# Patient Record
Sex: MALE | Race: WHITE | NOT HISPANIC OR LATINO | Employment: FULL TIME | URBAN - NONMETROPOLITAN AREA
[De-identification: names, ages, dates, MRNs, and addresses within clinical notes are randomized per-mention and may not be internally consistent; named-entity substitution may affect disease eponyms.]

---

## 2020-11-19 ENCOUNTER — LAB (OUTPATIENT)
Dept: LAB | Facility: HOSPITAL | Age: 65
End: 2020-11-19

## 2020-11-19 ENCOUNTER — OFFICE VISIT (OUTPATIENT)
Dept: NEUROSURGERY | Facility: CLINIC | Age: 65
End: 2020-11-19

## 2020-11-19 VITALS
HEIGHT: 72 IN | BODY MASS INDEX: 42.66 KG/M2 | TEMPERATURE: 98.4 F | DIASTOLIC BLOOD PRESSURE: 83 MMHG | RESPIRATION RATE: 20 BRPM | SYSTOLIC BLOOD PRESSURE: 151 MMHG | WEIGHT: 315 LBS | HEART RATE: 66 BPM

## 2020-11-19 DIAGNOSIS — M51.36 DEGENERATIVE DISC DISEASE, LUMBAR: Primary | ICD-10-CM

## 2020-11-19 DIAGNOSIS — M48.062 SPINAL STENOSIS OF LUMBAR REGION WITH NEUROGENIC CLAUDICATION: ICD-10-CM

## 2020-11-19 PROBLEM — M51.369 DEGENERATIVE DISC DISEASE, LUMBAR: Status: ACTIVE | Noted: 2020-11-19

## 2020-11-19 LAB
ANION GAP SERPL CALCULATED.3IONS-SCNC: 7.6 MMOL/L (ref 5–15)
BUN SERPL-MCNC: 19 MG/DL (ref 8–23)
BUN/CREAT SERPL: 18.6 (ref 7–25)
CALCIUM SPEC-SCNC: 9.6 MG/DL (ref 8.6–10.5)
CHLORIDE SERPL-SCNC: 107 MMOL/L (ref 98–107)
CO2 SERPL-SCNC: 27.4 MMOL/L (ref 22–29)
CREAT SERPL-MCNC: 1.02 MG/DL (ref 0.76–1.27)
GFR SERPL CREATININE-BSD FRML MDRD: 73 ML/MIN/1.73
GLUCOSE SERPL-MCNC: 116 MG/DL (ref 65–99)
HBA1C MFR BLD: 5.8 % (ref 4.8–5.6)
POTASSIUM SERPL-SCNC: 4.4 MMOL/L (ref 3.5–5.2)
SODIUM SERPL-SCNC: 142 MMOL/L (ref 136–145)

## 2020-11-19 PROCEDURE — 36415 COLL VENOUS BLD VENIPUNCTURE: CPT | Performed by: NEUROLOGICAL SURGERY

## 2020-11-19 PROCEDURE — 80048 BASIC METABOLIC PNL TOTAL CA: CPT | Performed by: NEUROLOGICAL SURGERY

## 2020-11-19 PROCEDURE — 99203 OFFICE O/P NEW LOW 30 MIN: CPT | Performed by: NEUROLOGICAL SURGERY

## 2020-11-19 PROCEDURE — 83036 HEMOGLOBIN GLYCOSYLATED A1C: CPT | Performed by: NEUROLOGICAL SURGERY

## 2020-11-19 RX ORDER — NABUMETONE 750 MG/1
750 TABLET, FILM COATED ORAL 2 TIMES DAILY
Qty: 60 TABLET | Refills: 1 | Status: SHIPPED | OUTPATIENT
Start: 2020-11-19 | End: 2021-10-26

## 2020-11-19 RX ORDER — PREDNISONE 10 MG/1
TABLET ORAL
COMMUNITY
End: 2021-10-26

## 2020-11-19 RX ORDER — ALLOPURINOL 300 MG/1
TABLET ORAL
COMMUNITY
Start: 2020-10-09

## 2020-11-19 RX ORDER — LOSARTAN POTASSIUM 100 MG/1
TABLET ORAL
COMMUNITY
Start: 2020-10-09 | End: 2021-10-26

## 2020-11-19 RX ORDER — METHOCARBAMOL 750 MG/1
750 TABLET, FILM COATED ORAL NIGHTLY
Qty: 30 TABLET | Refills: 1 | Status: SHIPPED | OUTPATIENT
Start: 2020-11-19 | End: 2021-10-26

## 2020-11-19 RX ORDER — BUMETANIDE 1 MG/1
TABLET ORAL
COMMUNITY

## 2020-11-19 RX ORDER — MELOXICAM 15 MG/1
TABLET ORAL
COMMUNITY
Start: 2020-10-26 | End: 2020-11-19

## 2020-11-19 RX ORDER — HYDROCODONE BITARTRATE AND ACETAMINOPHEN 10; 325 MG/1; MG/1
TABLET ORAL
COMMUNITY
End: 2022-04-26

## 2020-11-19 RX ORDER — AMLODIPINE BESYLATE 5 MG/1
TABLET ORAL
COMMUNITY
Start: 2020-10-09 | End: 2021-10-26

## 2020-11-19 RX ORDER — LIRAGLUTIDE 6 MG/ML
INJECTION SUBCUTANEOUS
COMMUNITY
End: 2021-10-26

## 2020-11-19 NOTE — PROGRESS NOTES
Wade Mack  1955  [id number]      Chief Complaint   Patient presents with   • Numbness     BLE   • Leg Swelling     BLE       HISTORY OF PRESENT ILLNESS: This is a 65-year-old male who presents with chief complaint of pain in his lumbar area, numbness and swelling of both lower extremities which have gotten progressively worse.  He has seen a rheumatologist and has been placed on meloxicam.  He has been on this several months and has seen no benefit.  He has also been on prednisone which has not helped.  In addition to the symptoms he has diabetes and is morbidly obese.  Lumbar MRI has been performed and he is referred for neurosurgical consultation.    His symptoms are consistent with neurogenic claudication.  His pain is worse when he is walking and standing.  Not been to physical therapy.    Past Medical History:   Diagnosis Date   • Arthritis    • Diabetes mellitus (CMS/HCC)    • Gout    • Hypertension    • Low back pain        Past Surgical History:   Procedure Laterality Date   • SHOULDER SURGERY Left        Family History   Problem Relation Age of Onset   • Hypertension Father    • Arthritis Father        Social History     Socioeconomic History   • Marital status:      Spouse name: Not on file   • Number of children: Not on file   • Years of education: Not on file   • Highest education level: Not on file   Tobacco Use   • Smoking status: Former Smoker     Packs/day: 3.00     Quit date: 1995     Years since quittin.0   • Smokeless tobacco: Never Used   Substance and Sexual Activity   • Alcohol use: Not Currently     Frequency: Never     Comment: Former use   • Drug use: Never   • Sexual activity: Defer       No Known Allergies      Current Outpatient Medications:   •  allopurinol (ZYLOPRIM) 300 MG tablet, , Disp: , Rfl:   •  amLODIPine (NORVASC) 5 MG tablet, , Disp: , Rfl:   •  bumetanide (BUMEX) 1 MG tablet, bumetanide 1 mg tablet, Disp: , Rfl:   •  HYDROcodone-acetaminophen  (NORCO)  MG per tablet, hydrocodone 10 mg-acetaminophen 325 mg tablet, Disp: , Rfl:   •  Liraglutide (Victoza) 18 MG/3ML solution pen-injector injection, Victoza 3-Mesfin 0.6 mg/0.1 mL (18 mg/3 mL) subcutaneous pen injector, Disp: , Rfl:   •  losartan (COZAAR) 100 MG tablet, , Disp: , Rfl:   •  meloxicam (MOBIC) 15 MG tablet, , Disp: , Rfl:   •  predniSONE (DELTASONE) 10 MG tablet, prednisone 10 mg tablet, Disp: , Rfl:     Review of Systems   Constitutional: Positive for fatigue. Negative for activity change, appetite change, chills, diaphoresis, fever and unexpected weight change.   HENT: Positive for hearing loss. Negative for congestion, dental problem, drooling, ear discharge, ear pain, facial swelling, mouth sores, nosebleeds, postnasal drip, rhinorrhea, sinus pressure, sneezing, sore throat, tinnitus, trouble swallowing and voice change.    Eyes: Negative for photophobia, pain, discharge, redness, itching and visual disturbance.   Respiratory: Positive for shortness of breath and wheezing. Negative for apnea, cough, choking, chest tightness and stridor.    Cardiovascular: Positive for leg swelling. Negative for chest pain and palpitations.   Gastrointestinal: Negative for abdominal distention, abdominal pain, anal bleeding, blood in stool, constipation, diarrhea, nausea, rectal pain and vomiting.   Endocrine: Negative for cold intolerance, heat intolerance, polydipsia, polyphagia and polyuria.   Genitourinary: Negative for decreased urine volume, difficulty urinating, dysuria, enuresis, flank pain, frequency, genital sores, hematuria and urgency.   Musculoskeletal: Positive for back pain and joint swelling. Negative for arthralgias, gait problem, myalgias, neck pain and neck stiffness.   Skin: Negative for color change, pallor, rash and wound.   Allergic/Immunologic: Negative for environmental allergies, food allergies and immunocompromised state.   Neurological: Positive for speech difficulty, weakness and  numbness. Negative for dizziness, tremors, seizures, syncope, facial asymmetry, light-headedness and headaches.   Hematological: Negative for adenopathy. Bruises/bleeds easily.   Psychiatric/Behavioral: Positive for agitation. Negative for behavioral problems, confusion, decreased concentration, dysphoric mood, hallucinations, self-injury, sleep disturbance and suicidal ideas. The patient is not nervous/anxious and is not hyperactive.    All other systems reviewed and are negative.      There were no vitals filed for this visit.    Neurological Examination:    Mental status/speech: The patient is alert and oriented.  Speech is clear without aphysia or dysarthria.  No overt cognitive deficits.    Cranial nerve examination:    Olfaction: Smell is intact.  Vision: Vision is intact without visual field abnormalities.  Funduscopic examination is normal.  No pupillary irregularity.  Ocular motor examination: The extraocular muscles are intact.  There is no diplopia.  The pupil is round and reactive to both light and accommodation.  There is no nystagmus.  Facial movement/sensation: There is no facial weakness.  Sensation is intact in the first, second, and third divisions of the trigeminal nerve.  The corneal reflex is intact.  Auditory: Hearing is intact to finger rub bilaterally.  Cranial nerves IX, X, XI, XII: Phonation is normal.  No dysphagia.  Tongue is protruded in the midline without atrophy.  The gag reflex is intact.  Shoulder shrug is normal.    Musculoligamentous ligamentous examination: BMI 49.0; weight 361 pounds.  He is morbidly obese with limited range of motion lumbar spine.  Straight leg raising, Lasègue and flip test are negative.  He has marked edema in both lower extremities of 3+ pitting.  He is areflexic in the upper and lower extremities.  His strength however is excellent.  There is no Babinski, Shadia or clonus.          Medical Decision Making:     Diagnostic Data Set: The lumbar MRI suggest  the presence of spinal stenosis in the lumbar region.      Assessment: Neurogenic claudication secondary to spinal stenosis; morbid obesity,          Recommendations: The lumbar MRI provides an explanation for his symptoms namely spinal stenosis secondary to degenerative osteoarthritis.  However he is not an acceptable surgical candidate at the present time because of his obesity and marked edema in both lower extremities.  The edema is not related to the arthritis.  He is taking steroids which I suggest to be discontinued.  This is counterproductive particularly in view of his diabetes.  I have given him a prescription of Relafen 750 mg twice daily; Robaxin-750 milligrams at night and have asked him to see Alistair Morris for a short course of physical therapy.  I am not confident this will provide much relief, unfortunately.  I have obtained a BMP and A1c for completeness sake.  He will call me in [place] in 2 weeks.    I have explained his diagnosis, therapeutic options and risks.  Our goal at this time should be that of weight reduction and further evaluation of the marked edema in both of his lower extremities.  Obviously he is retaining significant amount of fluid which need to be addressed before any consideration of surgical intervention.  I am hopeful we can help him.  I will keep you informed of his progress and if there is any information I need to know please feel free to call me as well.    The results of the A1c and BMP indicate a serum glucose of 116.  A1c 5.8.  No evidence of renal dysfunction.  These are surprisingly good.  I am hopeful he will continue to lose weight and show improvement with physical therapy and medication.  I will continue to follow him in the course keep you informed.    I greatly appreciate the opportunity to see and evaluate this individual.  If you have questions or concerns regarding issues that I may have overlooked please call me at any time: [phone].  Azael Brooks,  M.D.  [facility]  [address].  formerly [facility]  [id number]    Scribed for Henrique Brooks MD by Hyun Upton CMA 11/19/2020 09:22 EST  I have read and concur with the information provided by the scribe.  Henrique Brooks MD

## 2021-10-26 ENCOUNTER — OFFICE VISIT (OUTPATIENT)
Dept: CARDIOLOGY | Facility: CLINIC | Age: 66
End: 2021-10-26

## 2021-10-26 VITALS
HEIGHT: 73 IN | WEIGHT: 292.6 LBS | BODY MASS INDEX: 38.78 KG/M2 | DIASTOLIC BLOOD PRESSURE: 89 MMHG | SYSTOLIC BLOOD PRESSURE: 154 MMHG | HEART RATE: 56 BPM | OXYGEN SATURATION: 97 %

## 2021-10-26 DIAGNOSIS — R06.02 SOB (SHORTNESS OF BREATH): ICD-10-CM

## 2021-10-26 DIAGNOSIS — I48.91 ATRIAL FIBRILLATION, UNSPECIFIED TYPE (HCC): ICD-10-CM

## 2021-10-26 DIAGNOSIS — R00.2 PALPITATIONS: Primary | ICD-10-CM

## 2021-10-26 DIAGNOSIS — Z01.810 PRE-OPERATIVE CARDIOVASCULAR EXAMINATION: ICD-10-CM

## 2021-10-26 PROCEDURE — 93000 ELECTROCARDIOGRAM COMPLETE: CPT | Performed by: PHYSICIAN ASSISTANT

## 2021-10-26 PROCEDURE — 99204 OFFICE O/P NEW MOD 45 MIN: CPT | Performed by: PHYSICIAN ASSISTANT

## 2021-10-26 RX ORDER — HYDROCHLOROTHIAZIDE 25 MG/1
25 TABLET ORAL DAILY
COMMUNITY
End: 2022-04-26

## 2021-10-26 NOTE — PROGRESS NOTES
Subjective   Wade Mack is a 66 y.o. male     Chief Complaint   Patient presents with   • [facility] [place] rep. Nov 2021   • Palpitations       HPI  Patient presents to clinic today for preoperative cardiac evaluation, also in the setting of new onset A. fib.  He was seen through his primary care provider last week for preoperative clearance prior to right knee replacement.  By exam, he was noted to be irregular.  An EKG and follow-up supported A. fib which was new onset.  It was recommended that he have a cardiac clearance at that time and he presents today in the setting.  Clinically, the patient is doing well.  He does feel palpitations at times but would not have realized that he was in A. fib.  He reports no chest pain.  He has fairly advanced dyspnea which he feels is mostly related to his weight.  He has no PND orthopnea.  He denies dizziness or syncope.  His surgery for right knee replacement is scheduled in mid November.  He would like to expedite testing possible and maintain that appointment.  The patient has no further complaints at this time.      Current Outpatient Medications   Medication Sig Dispense Refill   • allopurinol (ZYLOPRIM) 300 MG tablet      • bumetanide (BUMEX) 1 MG tablet bumetanide 1 mg tablet     • hydroCHLOROthiazide (HYDRODIURIL) 25 MG tablet Take 25 mg by mouth Daily.     • HYDROcodone-acetaminophen (NORCO)  MG per tablet hydrocodone 10 mg-acetaminophen 325 mg tablet     • apixaban (ELIQUIS) 5 MG tablet tablet Take 1 tablet by mouth 2 (Two) Times a Day. 60 tablet 2     No current facility-administered medications for this visit.       Patient has no known allergies.    Past Medical History:   Diagnosis Date   • Arthritis    • Diabetes mellitus (HCC)    • Gout    • Hypertension    • Low back pain        Social History     Socioeconomic History   • Marital status:    Tobacco Use   • Smoking status: Former Smoker     Packs/day: 3.00     Quit date:  "1995     Years since quittin.9   • Smokeless tobacco: Never Used   Substance and Sexual Activity   • Alcohol use: Not Currently     Comment: Former use   • Drug use: Never   • Sexual activity: Defer       Family History   Problem Relation Age of Onset   • Hypertension Father    • Arthritis Father        Review of Systems   Constitutional: Negative.  Negative for chills, fatigue and fever.   HENT: Negative.  Negative for congestion, rhinorrhea and sore throat.    Eyes: Positive for visual disturbance (reading glasses).   Respiratory: Positive for shortness of breath. Negative for chest tightness and wheezing.    Cardiovascular: Positive for palpitations and leg swelling. Negative for chest pain.   Gastrointestinal: Negative.    Endocrine: Negative.    Genitourinary: Negative.    Musculoskeletal: Positive for arthralgias and back pain. Negative for neck pain.   Skin: Negative.  Negative for rash and wound.   Allergic/Immunologic: Negative.  Negative for environmental allergies.   Neurological: Negative for dizziness, weakness, numbness and headaches.   Hematological: Bruises/bleeds easily (bruises).   Psychiatric/Behavioral: Positive for sleep disturbance (staying asleep / c-pap).       Objective     Vitals:    10/26/21 1334   BP: 154/89   BP Location: Left arm   Patient Position: Sitting   Pulse: 56   SpO2: 97%   Weight: 133 kg (292 lb 9.6 oz)   Height: 185.4 cm (73\")        /89 (BP Location: Left arm, Patient Position: Sitting)   Pulse 56   Ht 185.4 cm (73\")   Wt 133 kg (292 lb 9.6 oz)   SpO2 97%   BMI 38.60 kg/m²      Lab Results (most recent)     None          Physical Exam  Vitals and nursing note reviewed.   Constitutional:       General: He is not in acute distress.     Appearance: He is well-developed.   HENT:      Head: Normocephalic and atraumatic.   Eyes:      Conjunctiva/sclera: Conjunctivae normal.      Pupils: Pupils are equal, round, and reactive to light.   Neck:      Vascular: No " JVD.      Trachea: No tracheal deviation.   Cardiovascular:      Rate and Rhythm: Normal rate. Rhythm irregularly irregular.      Heart sounds: Normal heart sounds.   Pulmonary:      Effort: Pulmonary effort is normal.      Breath sounds: Normal breath sounds.   Abdominal:      General: Bowel sounds are normal. There is no distension.      Palpations: Abdomen is soft. There is no mass.      Tenderness: There is no abdominal tenderness. There is no guarding or rebound.   Musculoskeletal:         General: No tenderness or deformity. Normal range of motion.      Cervical back: Normal range of motion and neck supple.      Right lower le+ Edema present.      Left lower le+ Edema present.   Skin:     General: Skin is warm and dry.      Coloration: Skin is not pale.      Findings: No erythema or rash.   Neurological:      Mental Status: He is alert and oriented to person, place, and time.   Psychiatric:         Behavior: Behavior normal.         Thought Content: Thought content normal.         Judgment: Judgment normal.         Procedure     ECG 12 Lead    Date/Time: 10/26/2021 1:40 PM  Performed by: Gagan Post PA  Authorized by: Gagan Post PA   Comparison: not compared with previous ECG   Comments: A. fib, rate 100 PVC noted, left axis, no acute changes noted.                 Assessment/Plan      Diagnosis Plan   1. Palpitations  Stress Test With Myocardial Perfusion One Day    Adult Transthoracic Echo Complete W/ Cont if Necessary Per Protocol    Cardiac Event Monitor   2. SOB (shortness of breath)  Stress Test With Myocardial Perfusion One Day    Adult Transthoracic Echo Complete W/ Cont if Necessary Per Protocol    Cardiac Event Monitor   3. Atrial fibrillation, unspecified type (HCC)     4. Pre-operative cardiovascular examination         1.  With new onset A. fib, I would like to schedule the patient for an event monitor.  We can evaluate A. fib burden, average rate, etc.  I can adjust rate  control and/or consider rhythm control medications at that time if needed pending results.    2.  The patient is in the preoperative setting, pending right knee replacement.  Because of new onset A. fib, he needs further cardiac evaluation otherwise.  He will need ischemia assessment.  He will be scheduled for Lexiscan nuclear stress test in the setting.  He would not do well with treadmill protocol.    3.  We will also schedule for an echocardiogram.  This will evaluate LV size and function, diastolic parameters, atrial dimensions, and cardiac structure otherwise.  This will be particularly important given new onset A. fib    4.  I would institute anticoagulation as his CHADS VASc score would be considered 3.  We will start the patient on Eliquis 5 mg 1 p.o. twice daily for CVA risk reduction.    5.  I would make no further adjustments of medications.  We can see him back as soon as we know results of all the above and recommend him further at that time.                 Electronically signed by:

## 2021-11-01 ENCOUNTER — TELEPHONE (OUTPATIENT)
Dept: CARDIOLOGY | Facility: CLINIC | Age: 66
End: 2021-11-01

## 2021-11-01 NOTE — TELEPHONE ENCOUNTER
Left VM for patient     Eliquis 5 mg was denied by insurance.     Per Gagan Post PA - we will try Xarelto 20 mg       Patient to call with any questions     If INS request a PA on Xarelto we will work on it.       AT [facility]

## 2021-11-01 NOTE — TELEPHONE ENCOUNTER
We received critical monitor results again - I have tried calling patient again to go over the medication that we are sending in to help control his heart rate Metoprolol 25 MG BID he is to monitor BP and HR for a week and call us with update      I left all of the above note in VM for patient.     AT [facility]              Critical on heart monitor- per Gagan CARSON - Metoprolol 25 mg BID and continue anticoagulation.      Attempted to call patient NO answer VM left.     AT [facility]

## 2021-11-02 ENCOUNTER — TELEPHONE (OUTPATIENT)
Dept: CARDIOLOGY | Facility: CLINIC | Age: 66
End: 2021-11-02

## 2021-11-02 DIAGNOSIS — I48.91 ATRIAL FIBRILLATION, UNSPECIFIED TYPE (HCC): Primary | ICD-10-CM

## 2021-11-02 NOTE — TELEPHONE ENCOUNTER
Pt lvm on triage line wanting the results on his monitor, the result still have no been placed in the chart, he also called stating that the xarelto was not covered on his insurance. I spoke with Gagan Post and he suggest trying warfarin, if he is okay with that we will get it called into the pharmacy for him.     I called the patient and with no answer, left him a message about the medication and  to give us a call back. Darcy Chang MA

## 2021-11-04 NOTE — TELEPHONE ENCOUNTER
Patient and office staff for the last 2-3 days have been trying to reach each other. Finally when patient called this am, I was able to talk with him. Both Xarelto and Eliquis over 300.00 a mo. So only other option is coumadin. Due to holidays and samples avail. Right now, ok per Gagan Post, PAC to give patient enough samples till after the holidays and then if samples unavail. will have to transition to Coumadin. Patient was fine with this. I discussed in detail with him, xarleto dosing would be 20 mg daily, not to miss or skip doses etc. Due to increased risk of stroke. Verbalized he understood. Provider also lives in [place] and to deliver meds to patient due to patient recently lost son and unable to leave home. PH,LPN

## 2021-11-12 ENCOUNTER — HOSPITAL ENCOUNTER (OUTPATIENT)
Dept: CARDIOLOGY | Facility: HOSPITAL | Age: 66
Discharge: HOME | End: 2021-11-12

## 2021-11-12 VITALS — HEIGHT: 73 IN | BODY MASS INDEX: 38.86 KG/M2 | WEIGHT: 293.21 LBS

## 2021-11-12 DIAGNOSIS — R06.02 SOB (SHORTNESS OF BREATH): ICD-10-CM

## 2021-11-12 DIAGNOSIS — R00.2 PALPITATIONS: ICD-10-CM

## 2021-11-12 PROCEDURE — 78452 HT MUSCLE IMAGE SPECT MULT: CPT | Performed by: INTERNAL MEDICINE

## 2021-11-12 PROCEDURE — 25010000002 REGADENOSON 0.4 MG/5ML SOLUTION: Performed by: INTERNAL MEDICINE

## 2021-11-12 PROCEDURE — A9500 TC99M SESTAMIBI: HCPCS | Performed by: INTERNAL MEDICINE

## 2021-11-12 PROCEDURE — 93306 TTE W/DOPPLER COMPLETE: CPT | Performed by: SPECIALIST

## 2021-11-12 PROCEDURE — 0 TECHNETIUM SESTAMIBI: Performed by: INTERNAL MEDICINE

## 2021-11-12 PROCEDURE — 93306 TTE W/DOPPLER COMPLETE: CPT

## 2021-11-12 PROCEDURE — 93017 CV STRESS TEST TRACING ONLY: CPT

## 2021-11-12 PROCEDURE — 78452 HT MUSCLE IMAGE SPECT MULT: CPT

## 2021-11-12 PROCEDURE — 93018 CV STRESS TEST I&R ONLY: CPT | Performed by: INTERNAL MEDICINE

## 2021-11-12 RX ADMIN — REGADENOSON 0.4 MG: 0.08 INJECTION, SOLUTION INTRAVENOUS at 11:09

## 2021-11-12 RX ADMIN — TECHNETIUM TC 99M SESTAMIBI 1 DOSE: 1 INJECTION INTRAVENOUS at 09:40

## 2021-11-12 RX ADMIN — TECHNETIUM TC 99M SESTAMIBI 1 DOSE: 1 INJECTION INTRAVENOUS at 11:09

## 2021-11-14 LAB
BH CV REST NUCLEAR ISOTOPE DOSE: 10 MCI
BH CV STRESS COMMENTS STAGE 1: NORMAL
BH CV STRESS DOSE REGADENOSON STAGE 1: 0.4
BH CV STRESS DURATION MIN STAGE 1: 0
BH CV STRESS DURATION SEC STAGE 1: 10
BH CV STRESS NUCLEAR ISOTOPE DOSE: 30 MCI
BH CV STRESS PROTOCOL 1: NORMAL
BH CV STRESS RECOVERY BP: NORMAL MMHG
BH CV STRESS RECOVERY HR: 130 BPM
BH CV STRESS STAGE 1: 1
MAXIMAL PREDICTED HEART RATE: 154 BPM
PERCENT MAX PREDICTED HR: 64.94 %
STRESS BASELINE BP: NORMAL MMHG
STRESS BASELINE HR: 107 BPM
STRESS PERCENT HR: 76 %
STRESS POST PEAK BP: NORMAL MMHG
STRESS POST PEAK HR: 100 BPM
STRESS TARGET HR: 131 BPM

## 2021-11-15 ENCOUNTER — TELEPHONE (OUTPATIENT)
Dept: CARDIOLOGY | Facility: CLINIC | Age: 66
End: 2021-11-15

## 2021-11-15 ENCOUNTER — LAB (OUTPATIENT)
Dept: CARDIOLOGY | Facility: CLINIC | Age: 66
End: 2021-11-15

## 2021-11-15 ENCOUNTER — OFFICE VISIT (OUTPATIENT)
Dept: CARDIOLOGY | Facility: CLINIC | Age: 66
End: 2021-11-15

## 2021-11-15 VITALS
WEIGHT: 290.4 LBS | HEART RATE: 91 BPM | BODY MASS INDEX: 38.49 KG/M2 | SYSTOLIC BLOOD PRESSURE: 135 MMHG | HEIGHT: 73 IN | OXYGEN SATURATION: 97 % | DIASTOLIC BLOOD PRESSURE: 82 MMHG

## 2021-11-15 DIAGNOSIS — R06.02 SOB (SHORTNESS OF BREATH): ICD-10-CM

## 2021-11-15 DIAGNOSIS — R00.2 PALPITATIONS: ICD-10-CM

## 2021-11-15 DIAGNOSIS — I48.19 PERSISTENT ATRIAL FIBRILLATION (HCC): Primary | ICD-10-CM

## 2021-11-15 DIAGNOSIS — R94.39 ABNORMAL NUCLEAR STRESS TEST: ICD-10-CM

## 2021-11-15 DIAGNOSIS — R07.2 PRECORDIAL PAIN: ICD-10-CM

## 2021-11-15 LAB
ANION GAP SERPL CALCULATED.3IONS-SCNC: 14.1 MMOL/L (ref 5–15)
BASOPHILS # BLD AUTO: 0.08 10*3/MM3 (ref 0–0.2)
BASOPHILS NFR BLD AUTO: 0.8 % (ref 0–1.5)
BUN SERPL-MCNC: 25 MG/DL (ref 8–23)
BUN/CREAT SERPL: 16.7 (ref 7–25)
CALCIUM SPEC-SCNC: 9.2 MG/DL (ref 8.6–10.5)
CHLORIDE SERPL-SCNC: 100 MMOL/L (ref 98–107)
CO2 SERPL-SCNC: 25.9 MMOL/L (ref 22–29)
CREAT SERPL-MCNC: 1.5 MG/DL (ref 0.76–1.27)
DEPRECATED RDW RBC AUTO: 44 FL (ref 37–54)
EOSINOPHIL # BLD AUTO: 0.52 10*3/MM3 (ref 0–0.4)
EOSINOPHIL NFR BLD AUTO: 5.4 % (ref 0.3–6.2)
ERYTHROCYTE [DISTWIDTH] IN BLOOD BY AUTOMATED COUNT: 14.4 % (ref 12.3–15.4)
GFR SERPL CREATININE-BSD FRML MDRD: 47 ML/MIN/1.73
GLUCOSE SERPL-MCNC: 112 MG/DL (ref 65–99)
HCT VFR BLD AUTO: 45.6 % (ref 37.5–51)
HGB BLD-MCNC: 14.7 G/DL (ref 13–17.7)
IMM GRANULOCYTES # BLD AUTO: 0.02 10*3/MM3 (ref 0–0.05)
IMM GRANULOCYTES NFR BLD AUTO: 0.2 % (ref 0–0.5)
LYMPHOCYTES # BLD AUTO: 2.63 10*3/MM3 (ref 0.7–3.1)
LYMPHOCYTES NFR BLD AUTO: 27.3 % (ref 19.6–45.3)
MCH RBC QN AUTO: 27.5 PG (ref 26.6–33)
MCHC RBC AUTO-ENTMCNC: 32.2 G/DL (ref 31.5–35.7)
MCV RBC AUTO: 85.4 FL (ref 79–97)
MONOCYTES # BLD AUTO: 0.7 10*3/MM3 (ref 0.1–0.9)
MONOCYTES NFR BLD AUTO: 7.3 % (ref 5–12)
NEUTROPHILS NFR BLD AUTO: 5.69 10*3/MM3 (ref 1.7–7)
NEUTROPHILS NFR BLD AUTO: 59 % (ref 42.7–76)
NRBC BLD AUTO-RTO: 0 /100 WBC (ref 0–0.2)
PLATELET # BLD AUTO: 286 10*3/MM3 (ref 140–450)
PMV BLD AUTO: 11.9 FL (ref 6–12)
POTASSIUM SERPL-SCNC: 3.2 MMOL/L (ref 3.5–5.2)
RBC # BLD AUTO: 5.34 10*6/MM3 (ref 4.14–5.8)
SODIUM SERPL-SCNC: 140 MMOL/L (ref 136–145)
WBC # BLD AUTO: 9.64 10*3/MM3 (ref 3.4–10.8)

## 2021-11-15 PROCEDURE — 99214 OFFICE O/P EST MOD 30 MIN: CPT | Performed by: PHYSICIAN ASSISTANT

## 2021-11-15 PROCEDURE — 85025 COMPLETE CBC W/AUTO DIFF WBC: CPT | Performed by: PHYSICIAN ASSISTANT

## 2021-11-15 PROCEDURE — 80048 BASIC METABOLIC PNL TOTAL CA: CPT | Performed by: PHYSICIAN ASSISTANT

## 2021-11-15 PROCEDURE — 36415 COLL VENOUS BLD VENIPUNCTURE: CPT

## 2021-11-15 RX ORDER — ASPIRIN 81 MG/1
81 TABLET ORAL DAILY
Qty: 30 TABLET | Refills: 3 | Status: SHIPPED | OUTPATIENT
Start: 2021-11-15 | End: 2022-04-26

## 2021-11-15 RX ORDER — ATORVASTATIN CALCIUM 20 MG/1
20 TABLET, FILM COATED ORAL DAILY
Qty: 30 TABLET | Refills: 3 | Status: SHIPPED | OUTPATIENT
Start: 2021-11-15 | End: 2022-02-11 | Stop reason: SDUPTHER

## 2021-11-15 RX ORDER — NITROGLYCERIN 0.4 MG/1
TABLET SUBLINGUAL
Qty: 25 TABLET | Refills: 2 | Status: SHIPPED | OUTPATIENT
Start: 2021-11-15

## 2021-11-15 NOTE — PATIENT INSTRUCTIONS

## 2021-11-15 NOTE — TELEPHONE ENCOUNTER
Received cardiac clearance request from  stating pt has total knee replaceent scheduled for 11/16/2021 and is requiring a cardiac clearance. Placed cardiac clearance request in Cristina's inbox to review and address with provider.

## 2021-11-15 NOTE — TELEPHONE ENCOUNTER
Patient seen in office today - set up for heart cath     AT [facility]           ----- Message from ALLI Chanel sent at 11/15/2021  1:20 PM EST -----  Follow-up 2 to 3 weeks.

## 2021-11-15 NOTE — PROGRESS NOTES
Problem list     Subjective   Wade Mack is a 66 y.o. male     Chief Complaint   Patient presents with   • Follow-up     ABN stress       HPI  This pleasant gentleman presents back to the office today to discuss stress test findings.  We had seen him because of new onset A. fib at initial evaluation.  He was started on anticoagulation therapy.  He was placed on a monitor and scheduled for stress test and an echo.  His monitor indicated recurrent episodes of A. fib with rapid ventricular response rates.  He was started on metoprolol.  He feels that that has improved some.  Because of new onset A. fib, preop clearance, and clinical scenario otherwise, he was scheduled for Cardiovascular testing otherwise.  He did have a stress test and an echo.  Echo preliminary only is available today.  Echo supported inferolateral wall ischemia, post stress EF at 41% with inferior and inferolateral hypokinesis, and no elevation of transient ischemic dilation ratio.  The patient is requested to come into the clinic today as he is pending knee replacement.  He would like to have a catheterization, if felt indicated, performed as quickly as possible.  He still has chest pain and dyspnea.  This occurs with exertion and with rapid ventricular response rates.  He reports ongoing dyspnea which can be rate limiting.  He has no failure symptoms.  He has no further complaints.    Current Outpatient Medications on File Prior to Visit   Medication Sig Dispense Refill   • allopurinol (ZYLOPRIM) 300 MG tablet      • bumetanide (BUMEX) 1 MG tablet bumetanide 1 mg tablet     • hydroCHLOROthiazide (HYDRODIURIL) 25 MG tablet Take 25 mg by mouth Daily.     • HYDROcodone-acetaminophen (NORCO)  MG per tablet hydrocodone 10 mg-acetaminophen 325 mg tablet     • metoprolol tartrate (LOPRESSOR) 25 MG tablet Take 1 tablet by mouth 2 (Two) Times a Day. 60 tablet 11   • rivaroxaban (Xarelto) 20 MG tablet Take 1 tablet by mouth Daily. 70 tablet 0  "    No current facility-administered medications on file prior to visit.       Patient has no known allergies.    Past Medical History:   Diagnosis Date   • Arthritis    • Diabetes mellitus (HCC)    • Gout    • Hypertension    • Low back pain        Social History     Socioeconomic History   • Marital status:    Tobacco Use   • Smoking status: Former Smoker     Packs/day: 3.00     Quit date: 1995     Years since quittin.0   • Smokeless tobacco: Never Used   Substance and Sexual Activity   • Alcohol use: Not Currently     Comment: Former use   • Drug use: Never   • Sexual activity: Defer       Family History   Problem Relation Age of Onset   • Hypertension Father    • Arthritis Father        Review of Systems   Constitutional: Positive for fatigue. Negative for chills and fever.   HENT: Negative.  Negative for congestion, rhinorrhea and sore throat.    Eyes: Positive for visual disturbance (reading glasses).   Respiratory: Positive for shortness of breath. Negative for chest tightness and wheezing.    Cardiovascular: Negative.  Negative for chest pain, palpitations and leg swelling.   Gastrointestinal: Negative.    Endocrine: Negative.    Genitourinary: Negative.    Musculoskeletal: Positive for arthralgias and back pain. Negative for neck pain.   Skin: Negative.  Negative for rash and wound.   Allergic/Immunologic: Negative.  Negative for environmental allergies.   Neurological: Positive for numbness (legs). Negative for dizziness, weakness and headaches.   Hematological: Negative.  Does not bruise/bleed easily.   Psychiatric/Behavioral: Positive for sleep disturbance (c-pap).       Objective   Vitals:    11/15/21 1616   BP: 135/82   BP Location: Left arm   Patient Position: Sitting   Pulse: 91   SpO2: 97%   Weight: 132 kg (290 lb 6.4 oz)   Height: 185 cm (72.84\")      /82 (BP Location: Left arm, Patient Position: Sitting)   Pulse 91   Ht 185 cm (72.84\")   Wt 132 kg (290 lb 6.4 oz)   " SpO2 97%   BMI 38.48 kg/m²    Lab Results (most recent)     None        Physical Exam  Vitals and nursing note reviewed.   Constitutional:       General: He is not in acute distress.     Appearance: He is well-developed.   HENT:      Head: Normocephalic and atraumatic.   Eyes:      Conjunctiva/sclera: Conjunctivae normal.      Pupils: Pupils are equal, round, and reactive to light.   Neck:      Vascular: No JVD.      Trachea: No tracheal deviation.   Cardiovascular:      Rate and Rhythm: Normal rate. Rhythm irregularly irregular.      Heart sounds: Murmur heard.    Systolic (LSB) murmur is present with a grade of 1/6.      Pulmonary:      Effort: Pulmonary effort is normal.      Breath sounds: Normal breath sounds.   Abdominal:      General: Bowel sounds are normal. There is no distension.      Palpations: Abdomen is soft. There is no mass.      Tenderness: There is no abdominal tenderness. There is no guarding or rebound.   Musculoskeletal:         General: No tenderness or deformity. Normal range of motion.      Cervical back: Normal range of motion and neck supple.   Skin:     General: Skin is warm and dry.      Coloration: Skin is not pale.      Findings: No erythema or rash.   Neurological:      Mental Status: He is alert and oriented to person, place, and time.   Psychiatric:         Behavior: Behavior normal.         Thought Content: Thought content normal.         Judgment: Judgment normal.           Procedure   Procedures       Assessment/Plan      Diagnosis Plan   1. Persistent atrial fibrillation (HCC)     2. Abnormal nuclear stress test  [facility]    atorvastatin (LIPITOR) 20 MG tablet    aspirin (aspirin) 81 MG EC tablet    nitroglycerin (NITROSTAT) 0.4 MG SL tablet    Basic Metabolic Panel    CBC & Differential   3. SOB (shortness of breath)  [facility]    atorvastatin (LIPITOR) 20 MG tablet    aspirin (aspirin) 81 MG EC tablet    nitroglycerin (NITROSTAT) 0.4 MG SL tablet     Basic Metabolic Panel    CBC & Differential   4. Precordial pain         1.  The patient presents to the clinic today to discuss stress test findings.  Stress test suggested inferolateral wall ischemia with reduced post stress systolic function.  He has ongoing symptoms concerning for angina/anginal equivalent issues, all as outlined above.  He is in the preoperative setting, pending knee replacement.  Given all the above, I do feel that we need to proceed on with catheterization.  He needs catheterization given low level symptoms and abnormal stress test findings, but we need to know catheterization data before recommendation is given on cardiac clearance with regards to his knee replacement.    2.  In that setting, he will be scheduled for catheterization later this week, on Wednesday.  He has not taken his Xarelto today and I have asked him to hold that until after catheterization.  He will start aspirin today and continue that even on the day of catheterization.    3.  We will also have the patient start statin therapy at this time.    4.  We did give him nitro to utilize as needed.    5.  I reinforced the importance of metoprolol.  He feels that he has improved significantly with rate control given A. fib with rapid ventricular response on metoprolol therapy.  We will continue metoprolol and agents otherwise as outlined above and is added today.    6.  He will need laboratories in the precath setting.    7.  We will see him back pending results of catheterization.  We can recommend him further at that time.  He will call for any issues prior to follow-up.           Advance Care Planning   ACP discussion was held with the patient during this visit. Patient does not have an advance directive, declines further assistance.      Electronically signed by:

## 2021-11-17 ENCOUNTER — TELEPHONE (OUTPATIENT)
Dept: CARDIOLOGY | Facility: CLINIC | Age: 66
End: 2021-11-17

## 2021-11-17 LAB
AORTIC DIMENSIONLESS INDEX: 0.8 (DI)
BH CV ECHO MEAS - ACS: 1.9 CM
BH CV ECHO MEAS - AO MAX PG (FULL): 1.5 MMHG
BH CV ECHO MEAS - AO MAX PG: 4 MMHG
BH CV ECHO MEAS - AO MEAN PG (FULL): 1 MMHG
BH CV ECHO MEAS - AO MEAN PG: 2 MMHG
BH CV ECHO MEAS - AO ROOT AREA (BSA CORRECTED): 1.3
BH CV ECHO MEAS - AO ROOT AREA: 8 CM^2
BH CV ECHO MEAS - AO ROOT DIAM: 3.2 CM
BH CV ECHO MEAS - AO V2 MAX: 100 CM/SEC
BH CV ECHO MEAS - AO V2 MEAN: 68.6 CM/SEC
BH CV ECHO MEAS - AO V2 VTI: 18.5 CM
BH CV ECHO MEAS - BSA(HAYCOCK): 2.7 M^2
BH CV ECHO MEAS - BSA: 2.5 M^2
BH CV ECHO MEAS - BZI_BMI: 38.5 KILOGRAMS/M^2
BH CV ECHO MEAS - BZI_METRIC_HEIGHT: 185.4 CM
BH CV ECHO MEAS - BZI_METRIC_WEIGHT: 132.5 KG
BH CV ECHO MEAS - EDV(CUBED): 91.7 ML
BH CV ECHO MEAS - EDV(TEICH): 92.9 ML
BH CV ECHO MEAS - EF(CUBED): 48.3 %
BH CV ECHO MEAS - EF(MOD-BP): 41 %
BH CV ECHO MEAS - EF(TEICH): 40.6 %
BH CV ECHO MEAS - ESV(CUBED): 47.4 ML
BH CV ECHO MEAS - ESV(TEICH): 55.2 ML
BH CV ECHO MEAS - FS: 19.7 %
BH CV ECHO MEAS - IVS/LVPW: 1
BH CV ECHO MEAS - IVSD: 1.1 CM
BH CV ECHO MEAS - LA A2CS (ATRIAL LENGTH): 7.3 CM
BH CV ECHO MEAS - LA DIMENSION: 4.3 CM
BH CV ECHO MEAS - LA/AO: 1.3
BH CV ECHO MEAS - LAT PEAK E' VEL: 13.6 CM/SEC
BH CV ECHO MEAS - LV IVRT: 0.11 SEC
BH CV ECHO MEAS - LV MASS(C)D: 173.4 GRAMS
BH CV ECHO MEAS - LV MASS(C)DI: 68.6 GRAMS/M^2
BH CV ECHO MEAS - LV MAX PG: 2.5 MMHG
BH CV ECHO MEAS - LV MEAN PG: 1 MMHG
BH CV ECHO MEAS - LV V1 MAX: 79 CM/SEC
BH CV ECHO MEAS - LV V1 MEAN: 48.2 CM/SEC
BH CV ECHO MEAS - LV V1 VTI: 15.4 CM
BH CV ECHO MEAS - LVIDD: 4.5 CM
BH CV ECHO MEAS - LVIDS: 3.6 CM
BH CV ECHO MEAS - LVPWD: 1.1 CM
BH CV ECHO MEAS - MED PEAK E' VEL: 9.2 CM/SEC
BH CV ECHO MEAS - MV DEC SLOPE: 486 CM/SEC^2
BH CV ECHO MEAS - MV DEC TIME: 0.25 SEC
BH CV ECHO MEAS - MV E MAX VEL: 73.8 CM/SEC
BH CV ECHO MEAS - MV MAX PG: 3.3 MMHG
BH CV ECHO MEAS - MV MEAN PG: 1 MMHG
BH CV ECHO MEAS - MV P1/2T MAX VEL: 101 CM/SEC
BH CV ECHO MEAS - MV P1/2T: 60.9 MSEC
BH CV ECHO MEAS - MV V2 MAX: 90.4 CM/SEC
BH CV ECHO MEAS - MV V2 MEAN: 53.1 CM/SEC
BH CV ECHO MEAS - MV V2 VTI: 16.1 CM
BH CV ECHO MEAS - MVA P1/2T LCG: 2.2 CM^2
BH CV ECHO MEAS - MVA(P1/2T): 3.6 CM^2
BH CV ECHO MEAS - PA MAX PG (FULL): 2.1 MMHG
BH CV ECHO MEAS - PA MAX PG: 3.5 MMHG
BH CV ECHO MEAS - PA MEAN PG (FULL): 1 MMHG
BH CV ECHO MEAS - PA MEAN PG: 2 MMHG
BH CV ECHO MEAS - PA V2 MAX: 93.3 CM/SEC
BH CV ECHO MEAS - PA V2 MEAN: 56 CM/SEC
BH CV ECHO MEAS - PA V2 VTI: 13.1 CM
BH CV ECHO MEAS - RAP SYSTOLE: 10 MMHG
BH CV ECHO MEAS - RV MAX PG: 1.4 MMHG
BH CV ECHO MEAS - RV MEAN PG: 1 MMHG
BH CV ECHO MEAS - RV V1 MAX: 58.7 CM/SEC
BH CV ECHO MEAS - RV V1 MEAN: 38.5 CM/SEC
BH CV ECHO MEAS - RV V1 VTI: 10.7 CM
BH CV ECHO MEAS - RVDD: 3.7 CM
BH CV ECHO MEAS - RVSP: 27 MMHG
BH CV ECHO MEAS - SI(AO): 58.9 ML/M^2
BH CV ECHO MEAS - SI(CUBED): 17.5 ML/M^2
BH CV ECHO MEAS - SI(TEICH): 14.9 ML/M^2
BH CV ECHO MEAS - SV(AO): 148.8 ML
BH CV ECHO MEAS - SV(CUBED): 44.3 ML
BH CV ECHO MEAS - SV(TEICH): 37.8 ML
BH CV ECHO MEAS - TR MAX VEL: 204 CM/SEC
BH CV ECHO MEASUREMENTS AVERAGE E/E' RATIO: 6.47
LEFT ATRIUM VOLUME INDEX: 40 ML/M2
LEFT ATRIUM VOLUME: 102 CM3
MAXIMAL PREDICTED HEART RATE: 154 BPM
SINUS: 3.4 CM
STRESS TARGET HR: 131 BPM

## 2021-11-17 NOTE — TELEPHONE ENCOUNTER
Spoke to patient on blood work Kidney levels elevated - patient is scheduled for a heart cath 11/17/21    Pre medication was sent to [facility] in [place]     Went over directions with patient he is to pick that up TODAY and start that.       Patient verbalized understanding       AT [facility]

## 2021-11-18 ENCOUNTER — OUTSIDE FACILITY SERVICE (OUTPATIENT)
Dept: CARDIOLOGY | Facility: CLINIC | Age: 66
End: 2021-11-18

## 2021-11-18 ENCOUNTER — TELEPHONE (OUTPATIENT)
Dept: CARDIOLOGY | Facility: CLINIC | Age: 66
End: 2021-11-18

## 2021-11-18 PROCEDURE — 93458 L HRT ARTERY/VENTRICLE ANGIO: CPT | Performed by: INTERNAL MEDICINE

## 2021-11-18 NOTE — TELEPHONE ENCOUNTER
Per Gagan Post, PAC acceptable risk for total knee replacement  With Dr Leon Castaneda and may hold anticoagulation three days prior must continue Aspirin.Cardiac Clearance letter faxed to his office.ARC,RMA

## 2021-11-30 ENCOUNTER — OFFICE VISIT (OUTPATIENT)
Dept: CARDIOLOGY | Facility: CLINIC | Age: 66
End: 2021-11-30

## 2021-11-30 VITALS
OXYGEN SATURATION: 97 % | SYSTOLIC BLOOD PRESSURE: 136 MMHG | HEART RATE: 72 BPM | WEIGHT: 306.6 LBS | BODY MASS INDEX: 40.63 KG/M2 | DIASTOLIC BLOOD PRESSURE: 96 MMHG | HEIGHT: 73 IN

## 2021-11-30 DIAGNOSIS — R06.02 SOB (SHORTNESS OF BREATH): ICD-10-CM

## 2021-11-30 DIAGNOSIS — I42.0 DILATED CARDIOMYOPATHY (HCC): ICD-10-CM

## 2021-11-30 DIAGNOSIS — I48.91 ATRIAL FIBRILLATION, UNSPECIFIED TYPE (HCC): Primary | ICD-10-CM

## 2021-11-30 PROCEDURE — 99213 OFFICE O/P EST LOW 20 MIN: CPT | Performed by: PHYSICIAN ASSISTANT

## 2021-11-30 NOTE — PATIENT INSTRUCTIONS

## 2021-11-30 NOTE — PROGRESS NOTES
Problem list     Subjective   Wade Mack is a 66 y.o. male     Chief Complaint   Patient presents with   • Follow-up     cath 11/18/21   Homeless:  1.  Atrial fibrillation.  1.1.  Managed currently with rate control and anticoagulation therapy.  2.  Dilated cardiomyopathy, possible tachycardia induced cardiomyopathy.  2.1.  Estimated EF at 40 to 45% per ventriculography during catheterization, 11/2021.  3.  Minor nonobstructive CAD per catheterization, 11/2021.  4.  Hypertension  5.  Dyslipidemia    HPI  The patient presents into the office for catheterization follow-up.  He was scheduled for catheterization because of evidence of cardiomyopathy and abnormal stress test findings otherwise.  Catheterization supported near normal coronaries.  EF by ventriculography still was felt to be at 40 to 45%.  Medical management was recommended.  At this time, the patient will be going for knee replacement tomorrow.  Cardiac clearance has been forwarded onto his surgical team.  He has already been time and directions otherwise for tomorrow.  He is holding Xarelto currently but will restart that as soon as possible in the postoperative timeframe.  Clinically, the patient feels improved.  Since starting metoprolol, he reports his episodes of rapid ventricular response rates have minimized.  He reports no dizziness or syncope.  He has stable dyspnea.  He has no chest pain.  He has no further complaints at this time.    Current Outpatient Medications on File Prior to Visit   Medication Sig Dispense Refill   • allopurinol (ZYLOPRIM) 300 MG tablet      • aspirin (aspirin) 81 MG EC tablet Take 1 tablet by mouth Daily. 30 tablet 3   • atorvastatin (LIPITOR) 20 MG tablet Take 1 tablet by mouth Daily. 30 tablet 3   • bumetanide (BUMEX) 1 MG tablet bumetanide 1 mg tablet     • hydroCHLOROthiazide (HYDRODIURIL) 25 MG tablet Take 25 mg by mouth Daily.     • HYDROcodone-acetaminophen (NORCO)  MG per tablet hydrocodone 10  mg-acetaminophen 325 mg tablet     • metoprolol tartrate (LOPRESSOR) 25 MG tablet Take 1 tablet by mouth 2 (Two) Times a Day. 60 tablet 11   • nitroglycerin (NITROSTAT) 0.4 MG SL tablet 1 under the tongue as needed for angina, may repeat q5mins for up three doses 25 tablet 2   • rivaroxaban (Xarelto) 20 MG tablet Take 1 tablet by mouth Daily. 70 tablet 0   • Acetylcysteine capsule capsule Take 1 capsule by mouth Take As Directed. 1 tab BID day before cath. 1 tab morning of cath. 3 capsule 0     No current facility-administered medications on file prior to visit.       Patient has no known allergies.    Past Medical History:   Diagnosis Date   • Arthritis    • Diabetes mellitus (HCC)    • Gout    • Hypertension    • Low back pain        Social History     Socioeconomic History   • Marital status:    Tobacco Use   • Smoking status: Former Smoker     Packs/day: 3.00     Quit date: 1995     Years since quittin.0   • Smokeless tobacco: Never Used   Substance and Sexual Activity   • Alcohol use: Not Currently     Comment: Former use   • Drug use: Never   • Sexual activity: Defer       Family History   Problem Relation Age of Onset   • Hypertension Father    • Arthritis Father        Review of Systems   Constitutional: Negative.  Negative for chills, fatigue and fever.   HENT: Positive for rhinorrhea. Negative for congestion and sore throat.    Eyes: Positive for visual disturbance (reading glasses).   Respiratory: Negative.  Negative for chest tightness, shortness of breath and wheezing.    Cardiovascular: Negative.  Negative for chest pain, palpitations and leg swelling.   Gastrointestinal: Negative.    Endocrine: Negative.    Genitourinary: Negative.    Musculoskeletal: Positive for arthralgias. Negative for back pain and neck pain.   Skin: Negative.  Negative for rash and wound.   Allergic/Immunologic: Positive for environmental allergies.   Neurological: Negative.  Negative for dizziness, weakness,  "numbness and headaches.   Hematological: Bruises/bleeds easily (bruises/ bleeds).   Psychiatric/Behavioral: Positive for sleep disturbance (c-pap).       Objective   Vitals:    21 1301   BP: 136/96   BP Location: Left arm   Patient Position: Sitting   Pulse: 72   SpO2: 97%   Weight: (!) 139 kg (306 lb 9.6 oz)   Height: 185 cm (72.84\")      /96 (BP Location: Left arm, Patient Position: Sitting)   Pulse 72   Ht 185 cm (72.84\")   Wt (!) 139 kg (306 lb 9.6 oz)   SpO2 97%   BMI 40.63 kg/m²    Lab Results (most recent)     None        Physical Exam  Vitals and nursing note reviewed.   Constitutional:       General: He is not in acute distress.     Appearance: He is well-developed.   HENT:      Head: Normocephalic and atraumatic.   Eyes:      Conjunctiva/sclera: Conjunctivae normal.      Pupils: Pupils are equal, round, and reactive to light.   Neck:      Vascular: No JVD.      Trachea: No tracheal deviation.   Cardiovascular:      Rate and Rhythm: Normal rate. Rhythm irregularly irregular.      Heart sounds: Normal heart sounds.   Pulmonary:      Effort: Pulmonary effort is normal.      Breath sounds: Normal breath sounds.   Abdominal:      General: Bowel sounds are normal. There is no distension.      Palpations: Abdomen is soft. There is no mass.      Tenderness: There is no abdominal tenderness. There is no guarding or rebound.   Musculoskeletal:         General: No tenderness or deformity. Normal range of motion.      Cervical back: Normal range of motion and neck supple.      Right lower le+ Edema present.      Left lower le+ Edema present.   Skin:     General: Skin is warm and dry.      Coloration: Skin is not pale.      Findings: No erythema or rash.   Neurological:      Mental Status: He is alert and oriented to person, place, and time.   Psychiatric:         Behavior: Behavior normal.         Thought Content: Thought content normal.         Judgment: Judgment normal.           Procedure "   Procedures       Assessment/Plan      Diagnosis Plan   1. Atrial fibrillation, unspecified type (HCC)     2. Dilated cardiomyopathy (HCC)     3. SOB (shortness of breath)       1.  At this time, the patient is doing reasonably well from cardiovascular standpoint.  He has started metoprolol as recommended at last evaluation.  He reports no significant episodes of rapid ventricular response rates since instituting that medication.  We will continue that and anticoagulation without change directed towards his atrial fibrillation.    2.  Catheterization indicates near normal coronaries.  Ventriculography supports an EF of 40 to 45%.  He will need intensify cardioprotective medications.  I will continue beta-blocker therapy.  He needs consideration for ACE inhibitor or angiotensin receptor blockade therapy.  I have discussed consideration for Entresto with him today, but he is concerned about medical coverage from his insurance.  I would institute that ordinarily today, but as the patient will be having surgery tomorrow, I would wait until after surgical status and until after rehab and institute accordingly at that time.    3.  We have discussed how to titrate diuretic regimen as needed and if needed.    4.  In that setting, we will continue medical regimen without change.  Given catheterization findings, we will treat him medically for now.  He will report back immediately for any symptoms.  We will see him back in 4 to 6 weeks at which time he should have completed surgery and the majority of his rehab indications.  We can adjust further at that time if needed.  He will call for any issues prior to follow-up.              Advance Care Planning   ACP discussion was held with the patient during this visit. Patient does not have an advance directive, declines further assistance.       Patient did not bring med list or medicine bottles to appointment, med list has been reviewed and updated based on patient's knowledge  of their meds.           Electronically signed by:

## 2022-01-11 ENCOUNTER — TELEPHONE (OUTPATIENT)
Dept: CARDIOLOGY | Facility: CLINIC | Age: 67
End: 2022-01-11

## 2022-01-11 ENCOUNTER — CLINICAL SUPPORT (OUTPATIENT)
Dept: CARDIOLOGY | Facility: CLINIC | Age: 67
End: 2022-01-11

## 2022-01-11 VITALS
HEIGHT: 73 IN | HEART RATE: 112 BPM | WEIGHT: 288.6 LBS | DIASTOLIC BLOOD PRESSURE: 93 MMHG | SYSTOLIC BLOOD PRESSURE: 130 MMHG | OXYGEN SATURATION: 96 % | BODY MASS INDEX: 38.25 KG/M2

## 2022-01-11 DIAGNOSIS — I48.91 ATRIAL FIBRILLATION, UNSPECIFIED TYPE: Primary | ICD-10-CM

## 2022-01-11 PROCEDURE — 93000 ELECTROCARDIOGRAM COMPLETE: CPT | Performed by: PHYSICIAN ASSISTANT

## 2022-01-11 NOTE — PROGRESS NOTES
Wade Mack  1955  1/11/2022   ?   Chief Complaint   Patient presents with   • Atrial Fibrillation     repeat EKG    • Palpitations      ?   HPI:   ?Patient presents today to follow up on A fib . He had his R knee complete replacement 12/1/2021. Is here today for better management of his A Fib   ?   ?     Current Outpatient Medications:   •  Acetylcysteine capsule capsule, Take 1 capsule by mouth Take As Directed. 1 tab BID day before cath. 1 tab morning of cath., Disp: 3 capsule, Rfl: 0  •  allopurinol (ZYLOPRIM) 300 MG tablet, , Disp: , Rfl:   •  aspirin (aspirin) 81 MG EC tablet, Take 1 tablet by mouth Daily., Disp: 30 tablet, Rfl: 3  •  atorvastatin (LIPITOR) 20 MG tablet, Take 1 tablet by mouth Daily., Disp: 30 tablet, Rfl: 3  •  bumetanide (BUMEX) 1 MG tablet, bumetanide 1 mg tablet, Disp: , Rfl:   •  hydroCHLOROthiazide (HYDRODIURIL) 25 MG tablet, Take 25 mg by mouth Daily., Disp: , Rfl:   •  HYDROcodone-acetaminophen (NORCO)  MG per tablet, hydrocodone 10 mg-acetaminophen 325 mg tablet, Disp: , Rfl:   •  metoprolol tartrate (LOPRESSOR) 25 MG tablet, Take 1 tablet by mouth 2 (Two) Times a Day., Disp: 60 tablet, Rfl: 11  •  nitroglycerin (NITROSTAT) 0.4 MG SL tablet, 1 under the tongue as needed for angina, may repeat q5mins for up three doses, Disp: 25 tablet, Rfl: 2  •  rivaroxaban (Xarelto) 20 MG tablet, Take 1 tablet by mouth Daily., Disp: 70 tablet, Rfl: 0   ?   ?   Patient has no known allergies.         ECG 12 Lead    Date/Time: 1/11/2022 10:00 AM  Performed by: Gagan Post PA  Authorized by: Gagan Post PA   Comparison: not compared with previous ECG   Rhythm: atrial fibrillation    Clinical impression: abnormal EKG             ?   Assessment/Plan    ?A Fib     Note reviewed by Gagan CARSON . Increase Metoprolol to 50 mg po Bid . Return in 1 month for a nurse visit to evaluate A FIB. Patient verbalized understanding of above.  ?   ?

## 2022-01-14 ENCOUNTER — TELEPHONE (OUTPATIENT)
Dept: CARDIOLOGY | Facility: CLINIC | Age: 67
End: 2022-01-14

## 2022-01-14 DIAGNOSIS — I10 HYPERTENSION, UNSPECIFIED TYPE: Primary | ICD-10-CM

## 2022-01-14 DIAGNOSIS — I42.0 DILATED CARDIOMYOPATHY: ICD-10-CM

## 2022-02-11 ENCOUNTER — CLINICAL SUPPORT (OUTPATIENT)
Dept: CARDIOLOGY | Facility: CLINIC | Age: 67
End: 2022-02-11

## 2022-02-11 VITALS
DIASTOLIC BLOOD PRESSURE: 78 MMHG | OXYGEN SATURATION: 96 % | WEIGHT: 283.8 LBS | HEIGHT: 73 IN | SYSTOLIC BLOOD PRESSURE: 113 MMHG | BODY MASS INDEX: 37.61 KG/M2 | HEART RATE: 87 BPM

## 2022-02-11 DIAGNOSIS — I42.0 DILATED CARDIOMYOPATHY: ICD-10-CM

## 2022-02-11 DIAGNOSIS — R06.02 SOB (SHORTNESS OF BREATH): ICD-10-CM

## 2022-02-11 DIAGNOSIS — I48.91 ATRIAL FIBRILLATION, UNSPECIFIED TYPE: Primary | ICD-10-CM

## 2022-02-11 DIAGNOSIS — R94.39 ABNORMAL NUCLEAR STRESS TEST: ICD-10-CM

## 2022-02-11 DIAGNOSIS — I10 HYPERTENSION, UNSPECIFIED TYPE: ICD-10-CM

## 2022-02-11 PROCEDURE — 93000 ELECTROCARDIOGRAM COMPLETE: CPT | Performed by: PHYSICIAN ASSISTANT

## 2022-02-11 RX ORDER — GABAPENTIN 300 MG/1
CAPSULE ORAL
COMMUNITY
Start: 2022-01-31 | End: 2022-06-01

## 2022-02-11 RX ORDER — WARFARIN SODIUM 2.5 MG/1
2.5 TABLET ORAL DAILY
COMMUNITY
Start: 2021-12-10 | End: 2022-02-11

## 2022-02-11 RX ORDER — WARFARIN SODIUM 5 MG/1
5 TABLET ORAL
COMMUNITY
Start: 2021-12-01 | End: 2022-06-01

## 2022-02-11 RX ORDER — WARFARIN SODIUM 4 MG/1
4 TABLET ORAL DAILY
COMMUNITY
Start: 2021-12-10 | End: 2022-02-11

## 2022-02-11 RX ORDER — ONDANSETRON 4 MG/1
TABLET, FILM COATED ORAL
COMMUNITY
Start: 2021-12-01 | End: 2022-06-01

## 2022-02-11 RX ORDER — WARFARIN SODIUM 1 MG/1
1 TABLET ORAL DAILY
COMMUNITY
Start: 2021-12-06 | End: 2022-02-11

## 2022-02-11 RX ORDER — ACETAMINOPHEN 500 MG/1
TABLET, FILM COATED ORAL
COMMUNITY
Start: 2021-12-01 | End: 2022-12-15

## 2022-02-11 RX ORDER — ATORVASTATIN CALCIUM 20 MG/1
20 TABLET, FILM COATED ORAL DAILY
Qty: 30 TABLET | Refills: 3 | Status: SHIPPED | OUTPATIENT
Start: 2022-02-11 | End: 2022-06-01 | Stop reason: SDUPTHER

## 2022-02-11 RX ORDER — COLCHICINE 0.6 MG/1
TABLET ORAL
COMMUNITY
Start: 2022-01-14 | End: 2022-04-26

## 2022-02-11 RX ORDER — METOPROLOL SUCCINATE 50 MG/1
50 TABLET, EXTENDED RELEASE ORAL 2 TIMES DAILY
Qty: 60 TABLET | Refills: 11 | Status: SHIPPED | OUTPATIENT
Start: 2022-02-11 | End: 2023-02-10 | Stop reason: SDUPTHER

## 2022-02-11 RX ORDER — TRAMADOL HYDROCHLORIDE 50 MG/1
TABLET ORAL
COMMUNITY
Start: 2021-12-08 | End: 2022-06-01

## 2022-02-11 RX ORDER — DOCUSATE SODIUM 100 MG/1
CAPSULE, LIQUID FILLED ORAL
COMMUNITY
Start: 2021-12-30 | End: 2022-06-01

## 2022-02-11 NOTE — PROGRESS NOTES
Wade Mack  1955  2/11/2022   ?   Chief Complaint   Patient presents with   • Follow-up     cardiac management-follow up afib   • Atrial Fibrillation     xarelto is working well for him-no episodes      ?   HPI:   ?Patient presents today for follow up on  A Fib repeat EKG today. Patient states he is not having any palpitations, SOB or flutters.     ?   ?     Current Outpatient Medications:   •  Acetylcysteine capsule capsule, Take 1 capsule by mouth Take As Directed. 1 tab BID day before cath. 1 tab morning of cath., Disp: 3 capsule, Rfl: 0  •  allopurinol (ZYLOPRIM) 300 MG tablet, , Disp: , Rfl:   •  aspirin (aspirin) 81 MG EC tablet, Take 1 tablet by mouth Daily., Disp: 30 tablet, Rfl: 3  •  atorvastatin (LIPITOR) 20 MG tablet, Take 1 tablet by mouth Daily., Disp: 30 tablet, Rfl: 3  •  bumetanide (BUMEX) 1 MG tablet, bumetanide 1 mg tablet, Disp: , Rfl:   •  colchicine 0.6 MG tablet, TAKE TWO TABLETS BY MOUTH THEN TAKE ONE TAB ONE HOUR LATER, Disp: , Rfl:   •  docusate sodium (COLACE) 100 MG capsule, TAKE 1 TO 2 CAPSULES BY MOUTH DAILY *DO NOT FILL UNTIL 12-01-21, Disp: , Rfl:   •  gabapentin (NEURONTIN) 300 MG capsule, TAKE 1 CAPSULE BY MOUTH AT BEDTIME *DO NOT FILL UNTIL 12-01-21, Disp: , Rfl:   •  hydroCHLOROthiazide (HYDRODIURIL) 25 MG tablet, Take 25 mg by mouth Daily., Disp: , Rfl:   •  HYDROcodone-acetaminophen (NORCO)  MG per tablet, hydrocodone 10 mg-acetaminophen 325 mg tablet, Disp: , Rfl:   •  metoprolol tartrate (LOPRESSOR) 25 MG tablet, Take 2 tablets by mouth 2 (Two) Times a Day., Disp: 60 tablet, Rfl: 11  •  nitroglycerin (NITROSTAT) 0.4 MG SL tablet, 1 under the tongue as needed for angina, may repeat q5mins for up three doses, Disp: 25 tablet, Rfl: 2  •  ondansetron (ZOFRAN) 4 MG tablet, TAKE ONE TABLET BY MOUTH EVERY 4 TO 6 HOURS *DO NOT FILL 12-01-21, Disp: , Rfl:   •  Pain Relief Extra Strength 500 MG tablet, TAKE 2 TABLETS BY MOUTH THREE TIMES A DAY *DO NOT FILL UNTIL 12-01-21,  Disp: , Rfl:   •  rivaroxaban (Xarelto) 20 MG tablet, Take 1 tablet by mouth Daily., Disp: 70 tablet, Rfl: 0  •  traMADol (ULTRAM) 50 MG tablet, TAKE 1-2 EVERY 6 HOURS AS NEEDED FOR PAIN, Disp: , Rfl:   •  enoxaparin (LOVENOX) 40 MG/0.4ML solution syringe, USE AS DIRECTED *STOP WARFARIN *START 01-13-22 FOR 5 DAYS 01-17-22, Disp: , Rfl:   •  warfarin (COUMADIN) 5 MG tablet, Take 5 mg by mouth every night at bedtime., Disp: , Rfl:    ?   ?   Patient has no known allergies.       Procedures     ?   Assessment/Plan    ?Chart was reviewed by Gagan CARSON . Patient is stable doing well . Metoprolol  Tart was D/C per previous note and changed to Metoprolol Succ. Patient is to continue all other medications same. He is to keep regular scheduled apt . He is to call with worsening symptoms or concerns.  ?   ?

## 2022-03-31 ENCOUNTER — TELEPHONE (OUTPATIENT)
Dept: CARDIOLOGY | Facility: CLINIC | Age: 67
End: 2022-03-31

## 2022-03-31 NOTE — TELEPHONE ENCOUNTER
Patient came in to  [facility]     Xarelto 20 mg     Given 4 bottles     Lot # [id number]  Exp: 4/2024    Provider Gagan CARSON     AT [facility]

## 2022-04-13 ENCOUNTER — TELEPHONE (OUTPATIENT)
Dept: CARDIOLOGY | Facility: CLINIC | Age: 67
End: 2022-04-13

## 2022-04-13 NOTE — TELEPHONE ENCOUNTER
CC received from Dr. Hansen patient to have LESI procedure no date scheduled     Has been put in providers box to complete     AT [facility]

## 2022-04-26 ENCOUNTER — OFFICE VISIT (OUTPATIENT)
Dept: CARDIOLOGY | Facility: CLINIC | Age: 67
End: 2022-04-26

## 2022-04-26 VITALS
BODY MASS INDEX: 40.24 KG/M2 | OXYGEN SATURATION: 98 % | SYSTOLIC BLOOD PRESSURE: 141 MMHG | DIASTOLIC BLOOD PRESSURE: 92 MMHG | HEART RATE: 82 BPM | WEIGHT: 305 LBS

## 2022-04-26 DIAGNOSIS — I48.91 ATRIAL FIBRILLATION, UNSPECIFIED TYPE: Primary | ICD-10-CM

## 2022-04-26 DIAGNOSIS — I42.0 DILATED CARDIOMYOPATHY: ICD-10-CM

## 2022-04-26 DIAGNOSIS — R06.02 SOB (SHORTNESS OF BREATH): ICD-10-CM

## 2022-04-26 PROCEDURE — 99213 OFFICE O/P EST LOW 20 MIN: CPT | Performed by: PHYSICIAN ASSISTANT

## 2022-04-26 RX ORDER — OXYCODONE HYDROCHLORIDE 10 MG/1
10 TABLET ORAL 4 TIMES DAILY
COMMUNITY
Start: 2022-04-12

## 2022-04-26 RX ORDER — LISINOPRIL 2.5 MG/1
2.5 TABLET ORAL DAILY
COMMUNITY
End: 2022-04-29 | Stop reason: SDUPTHER

## 2022-04-26 NOTE — PROGRESS NOTES
Subjective   Wade Mack is a 67 y.o. male     Chief Complaint   Patient presents with   • Follow-up   Problem list:    1.  Atrial fibrillation.  1.1.  Managed currently with rate control and anticoagulation therapy.  2.  Dilated cardiomyopathy, possible tachycardia induced cardiomyopathy.  2.1.  Estimated EF at 40 to 45% per ventriculography during catheterization, 11/2021.  3.  Minor nonobstructive CAD per catheterization, 11/2021.  4.  Hypertension  5.  Dyslipidemia    HPI    The patient presents into the clinic today for follow-up.  This pleasant patient is been followed historically given the above history.  At this time, he is responded well to medications.  A. fib is now well controlled with beta-blocker therapy.  He has no dysrhythmic symptoms.  He seems to have more strength and less dyspnea with medications.  He has no current chest pain.  He denies failure symptoms.  He has no dizziness or syncope.  He is tolerating medications without complication.  He has no further complaints otherwise and feels that he is doing well.    Current Outpatient Medications   Medication Sig Dispense Refill   • allopurinol (ZYLOPRIM) 300 MG tablet      • atorvastatin (LIPITOR) 20 MG tablet Take 1 tablet by mouth Daily. 30 tablet 3   • bumetanide (BUMEX) 1 MG tablet bumetanide 1 mg tablet     • docusate sodium (COLACE) 100 MG capsule TAKE 1 TO 2 CAPSULES BY MOUTH DAILY *DO NOT FILL UNTIL 12-01-21     • gabapentin (NEURONTIN) 300 MG capsule TAKE 1 CAPSULE BY MOUTH AT BEDTIME *DO NOT FILL UNTIL 12-01-21     • lisinopril (PRINIVIL,ZESTRIL) 2.5 MG tablet Take 2.5 mg by mouth Daily.     • metoprolol succinate XL (TOPROL-XL) 50 MG 24 hr tablet Take 1 tablet by mouth 2 (Two) Times a Day. 60 tablet 11   • nitroglycerin (NITROSTAT) 0.4 MG SL tablet 1 under the tongue as needed for angina, may repeat q5mins for up three doses 25 tablet 2   • ondansetron (ZOFRAN) 4 MG tablet TAKE ONE TABLET BY MOUTH EVERY 4 TO 6 HOURS *DO NOT FILL  21     • oxyCODONE (ROXICODONE) 10 MG tablet Take 10 mg by mouth 4 (Four) Times a Day.     • Pain Relief Extra Strength 500 MG tablet TAKE 2 TABLETS BY MOUTH THREE TIMES A DAY *DO NOT FILL UNTIL 21     • rivaroxaban (Xarelto) 20 MG tablet Take 1 tablet by mouth Daily. 70 tablet 0   • traMADol (ULTRAM) 50 MG tablet TAKE 1-2 EVERY 6 HOURS AS NEEDED FOR PAIN     • warfarin (COUMADIN) 5 MG tablet Take 5 mg by mouth every night at bedtime.       No current facility-administered medications for this visit.       Patient has no known allergies.    Past Medical History:   Diagnosis Date   • Arthritis    • Diabetes mellitus (HCC)    • Gout    • Hypertension    • Low back pain        Social History     Socioeconomic History   • Marital status:    Tobacco Use   • Smoking status: Former Smoker     Packs/day: 3.00     Quit date: 1995     Years since quittin.4   • Smokeless tobacco: Never Used   Substance and Sexual Activity   • Alcohol use: Not Currently     Comment: Former use   • Drug use: Never   • Sexual activity: Defer       Family History   Problem Relation Age of Onset   • Hypertension Father    • Arthritis Father        Review of Systems   Constitutional: Negative for activity change, appetite change, chills, fatigue and fever.   HENT: Negative.  Negative for congestion, rhinorrhea, sinus pressure, sinus pain and sore throat.    Eyes: Positive for visual disturbance (Reading).   Respiratory: Positive for apnea (CPAP). Negative for cough, chest tightness, shortness of breath and wheezing.    Cardiovascular: Negative.  Negative for chest pain, palpitations and leg swelling.   Gastrointestinal: Negative.  Negative for blood in stool, constipation, diarrhea, nausea and vomiting.   Endocrine: Negative.  Negative for cold intolerance and heat intolerance.   Genitourinary: Negative for difficulty urinating, dysuria, frequency, hematuria and urgency.   Musculoskeletal: Positive for arthralgias (All  throughout body), back pain (Lower back pain) and joint swelling (Rupert shoulders/hips/legs). Negative for gait problem, neck pain and neck stiffness.   Skin: Negative.  Negative for color change, rash and wound.   Allergic/Immunologic: Negative.  Negative for environmental allergies and food allergies.   Neurological: Negative.  Negative for dizziness, syncope, weakness, light-headedness, numbness and headaches.   Hematological: Negative.  Does not bruise/bleed easily.   Psychiatric/Behavioral: Negative.  Negative for sleep disturbance.       Objective     Vitals:    04/26/22 1255   BP: 141/92   BP Location: Left arm   Patient Position: Sitting   Cuff Size: Adult   Pulse: 82   SpO2: 98%   Weight: (!) 138 kg (305 lb)        /92 (BP Location: Left arm, Patient Position: Sitting, Cuff Size: Adult)   Pulse 82   Wt (!) 138 kg (305 lb)   SpO2 98%   BMI 40.24 kg/m²      Lab Results (most recent)     None          Physical Exam  Vitals and nursing note reviewed.   Constitutional:       General: He is not in acute distress.     Appearance: He is well-developed.   HENT:      Head: Normocephalic and atraumatic.   Eyes:      Conjunctiva/sclera: Conjunctivae normal.      Pupils: Pupils are equal, round, and reactive to light.   Neck:      Vascular: No JVD.      Trachea: No tracheal deviation.   Cardiovascular:      Rate and Rhythm: Normal rate. Rhythm irregular.      Heart sounds: Normal heart sounds.   Pulmonary:      Effort: Pulmonary effort is normal.      Breath sounds: Normal breath sounds.   Abdominal:      General: Bowel sounds are normal. There is no distension.      Palpations: Abdomen is soft. There is no mass.      Tenderness: There is no abdominal tenderness. There is no guarding or rebound.   Musculoskeletal:         General: No tenderness or deformity. Normal range of motion.      Cervical back: Normal range of motion and neck supple.   Skin:     General: Skin is warm and dry.      Coloration: Skin is not  pale.      Findings: No erythema or rash.   Neurological:      Mental Status: He is alert and oriented to person, place, and time.   Psychiatric:         Behavior: Behavior normal.         Thought Content: Thought content normal.         Judgment: Judgment normal.         Procedure   Procedures         Assessment/Plan      Diagnosis Plan   1. Atrial fibrillation, unspecified type (HCC)     2. Dilated cardiomyopathy (HCC)     3. SOB (shortness of breath)         1.  At this time, the patient is doing well.  A. fib is well treated with rate control and anticoagulation therapy.  We will continue that regimen without change.    2.  I would like to add ACE inhibitor therapy to the patient's beta-blocker therapy to address his cardiomyopathy.  We had discussed Entresto at last evaluation, and he was concerned about out-of-pocket cost.  That was held.  I will go ahead and start him on lisinopril now.    3.  We did give him samples of Xarelto at his request.    4.  2 months after starting ACE inhibitor therapy, I would like to repeat a limited echo to reevaluate systolic function.  I will continue his medical regimen otherwise to address the same.    5.  As the patient is doing well, nothing further.  If echo findings are stable to improved, we will continue and will resume 6-month follow-ups.      Patient brought in medicine list to appointment, it's been reviewed with patient and med list was updated in the chart.     Advance Care Planning   ACP discussion was declined by the patient. Patient does not have an advance directive, declines further assistance.           Electronically signed by:

## 2022-04-29 RX ORDER — LISINOPRIL 2.5 MG/1
2.5 TABLET ORAL DAILY
Qty: 90 TABLET | Refills: 3 | Status: SHIPPED | OUTPATIENT
Start: 2022-04-29 | End: 2023-02-10 | Stop reason: SDUPTHER

## 2022-05-10 ENCOUNTER — LAB (OUTPATIENT)
Dept: LAB | Facility: HOSPITAL | Age: 67
End: 2022-05-10

## 2022-05-10 DIAGNOSIS — I48.91 ATRIAL FIBRILLATION, UNSPECIFIED TYPE: ICD-10-CM

## 2022-05-10 DIAGNOSIS — I42.0 DILATED CARDIOMYOPATHY: ICD-10-CM

## 2022-05-10 DIAGNOSIS — R06.02 SOB (SHORTNESS OF BREATH): ICD-10-CM

## 2022-05-10 LAB
ANION GAP SERPL CALCULATED.3IONS-SCNC: 13.1 MMOL/L (ref 5–15)
BUN SERPL-MCNC: 20 MG/DL (ref 8–23)
BUN/CREAT SERPL: 16.3 (ref 7–25)
CALCIUM SPEC-SCNC: 9.3 MG/DL (ref 8.6–10.5)
CHLORIDE SERPL-SCNC: 101 MMOL/L (ref 98–107)
CO2 SERPL-SCNC: 25.9 MMOL/L (ref 22–29)
CREAT SERPL-MCNC: 1.23 MG/DL (ref 0.76–1.27)
EGFRCR SERPLBLD CKD-EPI 2021: 64.3 ML/MIN/1.73
GLUCOSE SERPL-MCNC: 104 MG/DL (ref 65–99)
POTASSIUM SERPL-SCNC: 4.2 MMOL/L (ref 3.5–5.2)
SODIUM SERPL-SCNC: 140 MMOL/L (ref 136–145)

## 2022-05-10 PROCEDURE — 80048 BASIC METABOLIC PNL TOTAL CA: CPT

## 2022-05-10 PROCEDURE — 36415 COLL VENOUS BLD VENIPUNCTURE: CPT

## 2022-05-13 ENCOUNTER — TELEPHONE (OUTPATIENT)
Dept: CARDIOLOGY | Facility: CLINIC | Age: 67
End: 2022-05-13

## 2022-05-13 NOTE — TELEPHONE ENCOUNTER
Spoke to patient on labs - sending copy to PCP       Patient verbalized oK     AT [facility]       ----- Message from ALLI Chanel sent at 5/13/2022  8:47 AM EDT -----  Stable.  Routine follow-up.

## 2022-05-26 ENCOUNTER — HOSPITAL ENCOUNTER (OUTPATIENT)
Dept: CARDIOLOGY | Facility: HOSPITAL | Age: 67
Discharge: HOME | End: 2022-05-26
Admitting: PHYSICIAN ASSISTANT

## 2022-05-26 DIAGNOSIS — I48.91 ATRIAL FIBRILLATION, UNSPECIFIED TYPE: ICD-10-CM

## 2022-05-26 DIAGNOSIS — R06.02 SOB (SHORTNESS OF BREATH): ICD-10-CM

## 2022-05-26 DIAGNOSIS — I42.0 DILATED CARDIOMYOPATHY: ICD-10-CM

## 2022-05-26 PROCEDURE — 93308 TTE F-UP OR LMTD: CPT | Performed by: INTERNAL MEDICINE

## 2022-05-26 PROCEDURE — 93308 TTE F-UP OR LMTD: CPT

## 2022-06-01 ENCOUNTER — OFFICE VISIT (OUTPATIENT)
Dept: CARDIOLOGY | Facility: CLINIC | Age: 67
End: 2022-06-01

## 2022-06-01 ENCOUNTER — TELEPHONE (OUTPATIENT)
Dept: CARDIOLOGY | Facility: CLINIC | Age: 67
End: 2022-06-01

## 2022-06-01 VITALS
DIASTOLIC BLOOD PRESSURE: 73 MMHG | HEIGHT: 73 IN | HEART RATE: 67 BPM | OXYGEN SATURATION: 97 % | WEIGHT: 309.8 LBS | BODY MASS INDEX: 41.06 KG/M2 | SYSTOLIC BLOOD PRESSURE: 112 MMHG

## 2022-06-01 DIAGNOSIS — I42.0 DILATED CARDIOMYOPATHY: Primary | ICD-10-CM

## 2022-06-01 DIAGNOSIS — R06.02 SOB (SHORTNESS OF BREATH): ICD-10-CM

## 2022-06-01 DIAGNOSIS — I48.91 ATRIAL FIBRILLATION, UNSPECIFIED TYPE: ICD-10-CM

## 2022-06-01 PROCEDURE — 99213 OFFICE O/P EST LOW 20 MIN: CPT | Performed by: PHYSICIAN ASSISTANT

## 2022-06-01 RX ORDER — ATORVASTATIN CALCIUM 20 MG/1
20 TABLET, FILM COATED ORAL DAILY
Qty: 90 TABLET | Refills: 3 | Status: SHIPPED | OUTPATIENT
Start: 2022-06-01

## 2022-06-01 NOTE — PROGRESS NOTES
Problem list     Subjective   Wade Mack is a 67 y.o. male     Chief Complaint   Patient presents with   • Follow-up     6 month    • Atrial Fibrillation   Problem list:     1.  Atrial fibrillation.  1.1.  Managed currently with rate control and anticoagulation therapy.  2.  Dilated cardiomyopathy, possible tachycardia induced cardiomyopathy.  2.1.  Estimated EF at 40 to 45% per ventriculography during catheterization, 11/2021.  3.  Minor nonobstructive CAD per catheterization, 11/2021.  4.  Hypertension  5.  Dyslipidemia    HPI  The patient presents in the clinic today for routine evaluation and follow-up.  He just recently had an echocardiogram and found report is not available.  Preliminary report supports improvement in systolic function, but I am awaiting final read on that.  Clinically, the patient is doing well.  He currently has no angina.  He denies failure symptoms.  Blood pressures are well controlled.  He reports intermittent palpitations but really no sustained dysrhythmic activity.  He is doing well on medical regimen.  He was recently instituted on lisinopril and reports that he is now normotensive the majority of the time.  Lisinopril was instituted given history of dilated cardiomyopathy.  He has no further complaints otherwise and feels that he is doing well.    Current Outpatient Medications on File Prior to Visit   Medication Sig Dispense Refill   • allopurinol (ZYLOPRIM) 300 MG tablet      • bumetanide (BUMEX) 1 MG tablet bumetanide 1 mg tablet     • lisinopril (PRINIVIL,ZESTRIL) 2.5 MG tablet Take 1 tablet by mouth Daily. 90 tablet 3   • metoprolol succinate XL (TOPROL-XL) 50 MG 24 hr tablet Take 1 tablet by mouth 2 (Two) Times a Day. 60 tablet 11   • nitroglycerin (NITROSTAT) 0.4 MG SL tablet 1 under the tongue as needed for angina, may repeat q5mins for up three doses 25 tablet 2   • oxyCODONE (ROXICODONE) 10 MG tablet Take 10 mg by mouth 4 (Four) Times a Day.     • Pain Relief Extra  Strength 500 MG tablet TAKE 2 TABLETS BY MOUTH THREE TIMES A DAY *DO NOT FILL UNTIL 21     • rivaroxaban (Xarelto) 20 MG tablet Take 1 tablet by mouth Daily. 70 tablet 0   • [DISCONTINUED] atorvastatin (LIPITOR) 20 MG tablet Take 1 tablet by mouth Daily. 30 tablet 3   • [DISCONTINUED] docusate sodium (COLACE) 100 MG capsule TAKE 1 TO 2 CAPSULES BY MOUTH DAILY *DO NOT FILL UNTIL 21     • [DISCONTINUED] gabapentin (NEURONTIN) 300 MG capsule TAKE 1 CAPSULE BY MOUTH AT BEDTIME *DO NOT FILL UNTIL 21     • [DISCONTINUED] ondansetron (ZOFRAN) 4 MG tablet TAKE ONE TABLET BY MOUTH EVERY 4 TO 6 HOURS *DO NOT FILL 21     • [DISCONTINUED] traMADol (ULTRAM) 50 MG tablet TAKE 1-2 EVERY 6 HOURS AS NEEDED FOR PAIN     • [DISCONTINUED] warfarin (COUMADIN) 5 MG tablet Take 5 mg by mouth every night at bedtime.       No current facility-administered medications on file prior to visit.       Patient has no known allergies.    Past Medical History:   Diagnosis Date   • Arthritis    • Diabetes mellitus (HCC)    • Gout    • Hypertension    • Low back pain        Social History     Socioeconomic History   • Marital status:    Tobacco Use   • Smoking status: Former Smoker     Packs/day: 3.00     Quit date: 1995     Years since quittin.5   • Smokeless tobacco: Never Used   Substance and Sexual Activity   • Alcohol use: Not Currently     Comment: Former use   • Drug use: Never   • Sexual activity: Defer       Family History   Problem Relation Age of Onset   • Hypertension Father    • Arthritis Father        Review of Systems   Constitutional: Negative.  Negative for chills, fatigue and fever.   HENT: Negative.  Negative for rhinorrhea and sore throat.    Eyes: Positive for visual disturbance (reading glasses).   Respiratory: Negative.  Negative for chest tightness, shortness of breath and wheezing.    Cardiovascular: Negative.  Negative for chest pain, palpitations and leg swelling.  "  Gastrointestinal: Negative.    Endocrine: Negative.    Genitourinary: Negative.    Musculoskeletal: Positive for arthralgias and back pain. Negative for neck pain.   Skin: Negative.  Negative for rash and wound.   Allergic/Immunologic: Negative.  Negative for environmental allergies.   Neurological: Positive for numbness (legs). Negative for dizziness, weakness and headaches.   Hematological: Bruises/bleeds easily (bruises).   Psychiatric/Behavioral: Positive for sleep disturbance (cpap ).       Objective   Vitals:    06/01/22 0843   BP: 112/73   BP Location: Left arm   Patient Position: Sitting   Pulse: 67   SpO2: 97%   Weight: (!) 141 kg (309 lb 12.8 oz)   Height: 185.4 cm (73\")      /73 (BP Location: Left arm, Patient Position: Sitting)   Pulse 67   Ht 185.4 cm (73\")   Wt (!) 141 kg (309 lb 12.8 oz)   SpO2 97%   BMI 40.87 kg/m²    Lab Results (most recent)     None        Physical Exam  Vitals and nursing note reviewed.   Constitutional:       General: He is not in acute distress.     Appearance: He is well-developed.   HENT:      Head: Normocephalic and atraumatic.   Eyes:      Conjunctiva/sclera: Conjunctivae normal.      Pupils: Pupils are equal, round, and reactive to light.   Neck:      Vascular: No JVD.      Trachea: No tracheal deviation.   Cardiovascular:      Rate and Rhythm: Normal rate. Rhythm irregular.      Heart sounds: Normal heart sounds.   Pulmonary:      Effort: Pulmonary effort is normal.      Breath sounds: Normal breath sounds.   Abdominal:      General: Bowel sounds are normal. There is no distension.      Palpations: Abdomen is soft. There is no mass.      Tenderness: There is no abdominal tenderness. There is no guarding or rebound.   Musculoskeletal:         General: No tenderness or deformity. Normal range of motion.      Cervical back: Normal range of motion and neck supple.   Skin:     General: Skin is warm and dry.      Coloration: Skin is not pale.      Findings: No " erythema or rash.   Neurological:      Mental Status: He is alert and oriented to person, place, and time.   Psychiatric:         Behavior: Behavior normal.         Thought Content: Thought content normal.         Judgment: Judgment normal.           Procedure   Procedures       Assessment & Plan      Diagnosis Plan   1. Dilated cardiomyopathy (HCC)     2. SOB (shortness of breath)  atorvastatin (LIPITOR) 20 MG tablet   3. Atrial fibrillation, unspecified type (HCC)       1.  At this time, the patient appears to be doing well from general cardiovascular standpoint.  He is well treated on current medical regimen.  I will continue that without change.    2.  A. fib continues to be well controlled and treated on rate control and anticoagulation therapy.  We did give him samples of Xarelto at today's evaluation.    3.  He has tolerated institution of lisinopril without complication.  I now feel he is on appropriate cardioprotective regimen.    4.  As the patient is doing well, nothing further.  He will call for complications.  We will continue to see him on routine 6-month intervals.           Advance Care Planning   ACP discussion was declined by the patient. Patient does not have an advance directive, declines further assistance.           Electronically signed by:

## 2022-06-01 NOTE — TELEPHONE ENCOUNTER
Patient in office was given samples     Xarelto 20 mg    Lot # [id number]   Exp 4/2024  Given 4 bottles     Provider Gagan CARSON    AT [facility]

## 2022-06-17 LAB
BH CV ECHO MEAS - ACS: 2.3 CM
BH CV ECHO MEAS - AO ROOT DIAM: 2.9 CM
BH CV ECHO MEAS - EDV(CUBED): 65.9 ML
BH CV ECHO MEAS - EDV(MOD-SP2): 149 ML
BH CV ECHO MEAS - EDV(MOD-SP4): 99.3 ML
BH CV ECHO MEAS - EF(MOD-BP): 59.1 %
BH CV ECHO MEAS - EF(MOD-SP2): 64.4 %
BH CV ECHO MEAS - EF(MOD-SP4): 50.5 %
BH CV ECHO MEAS - EF_3D-VOL: 35 %
BH CV ECHO MEAS - ESV(CUBED): 22.7 ML
BH CV ECHO MEAS - ESV(MOD-SP2): 53 ML
BH CV ECHO MEAS - ESV(MOD-SP4): 49.2 ML
BH CV ECHO MEAS - FS: 30 %
BH CV ECHO MEAS - IVS/LVPW: 0.91 CM
BH CV ECHO MEAS - IVSD: 1.16 CM
BH CV ECHO MEAS - LA DIMENSION: 4.6 CM
BH CV ECHO MEAS - LV DIASTOLIC VOL/BSA (35-75): 38.6 CM2
BH CV ECHO MEAS - LV MASS(C)D: 172.1 GRAMS
BH CV ECHO MEAS - LV SYSTOLIC VOL/BSA (12-30): 19.1 CM2
BH CV ECHO MEAS - LVIDD: 4 CM
BH CV ECHO MEAS - LVIDS: 2.8 CM
BH CV ECHO MEAS - LVOT AREA: 4.1 CM2
BH CV ECHO MEAS - LVOT DIAM: 2.27 CM
BH CV ECHO MEAS - LVPWD: 1.28 CM
BH CV ECHO MEAS - RVDD: 3.1 CM
BH CV ECHO MEAS - SI(MOD-SP2): 37.3 ML/M2
BH CV ECHO MEAS - SI(MOD-SP4): 19.5 ML/M2
BH CV ECHO MEAS - SV(MOD-SP2): 96 ML
BH CV ECHO MEAS - SV(MOD-SP4): 50.1 ML
LEFT ATRIUM VOLUME INDEX: 41.6 ML/M2
MAXIMAL PREDICTED HEART RATE: 153 BPM
STRESS TARGET HR: 130 BPM

## 2022-06-20 ENCOUNTER — TELEPHONE (OUTPATIENT)
Dept: CARDIOLOGY | Facility: CLINIC | Age: 67
End: 2022-06-20

## 2022-06-20 NOTE — TELEPHONE ENCOUNTER
ECHO  Pt notified of no acute findings. Provider will discuss results at f/u. Pt reminded of appt date and time.  ----- Message from Gloria Pelayo MA sent at 6/20/2022  2:59 PM EDT -----    ----- Message -----  From: Gagan Post PA  Sent: 6/20/2022   9:01 AM EDT  To: Gloria Pelayo MA    Routine follow-up.

## 2022-06-27 ENCOUNTER — TELEPHONE (OUTPATIENT)
Dept: CARDIOLOGY | Facility: CLINIC | Age: 67
End: 2022-06-27

## 2022-06-27 NOTE — TELEPHONE ENCOUNTER
Patient picked up 2 bottles of Xarelto 20 mg.   Lot # [id number]  Exo 11/24      Patient called requesting samples of Xarelto. Advised samples are available for pickup and we will hold 7 days. Maria Elena Campbell MA

## 2022-07-12 ENCOUNTER — TELEPHONE (OUTPATIENT)
Dept: CARDIOLOGY | Facility: CLINIC | Age: 67
End: 2022-07-12

## 2022-07-13 ENCOUNTER — TELEPHONE (OUTPATIENT)
Dept: CARDIOLOGY | Facility: CLINIC | Age: 67
End: 2022-07-13

## 2022-07-13 NOTE — TELEPHONE ENCOUNTER
Pt LVM on triage stating that he has 2 days left of Xarelto 20mg and either needs more samples or would like the medication to be changed.

## 2022-07-13 NOTE — TELEPHONE ENCOUNTER
Patient called requesting samples of Xarelto. Advised samples are available and we will hold 7 days. Maria Elena Campbell MA

## 2022-07-14 ENCOUNTER — TELEPHONE (OUTPATIENT)
Dept: CARDIOLOGY | Facility: CLINIC | Age: 67
End: 2022-07-14

## 2022-07-26 ENCOUNTER — TELEPHONE (OUTPATIENT)
Dept: CARDIOLOGY | Facility: CLINIC | Age: 67
End: 2022-07-26

## 2022-07-26 NOTE — TELEPHONE ENCOUNTER
Patient called requesting samples Xarelto. Advised samples are available for pickup and we will hold 7 days.    [facility] quoted copay as $424.     Patient picked up 2 bottles of Xarelto 20 mg  Lot # [id number]  Exp 10/24

## 2022-08-01 ENCOUNTER — TELEPHONE (OUTPATIENT)
Dept: CARDIOLOGY | Facility: CLINIC | Age: 67
End: 2022-08-01

## 2022-08-01 NOTE — TELEPHONE ENCOUNTER
Received cardiac clearance request from  stating pt has LESI to be scheduled and is requiring a cardiac clearance. Placed cardiac clearance request in Gloria's inbox to review and address with provider.

## 2022-10-24 ENCOUNTER — TELEMEDICINE (OUTPATIENT)
Dept: CARDIOLOGY | Facility: CLINIC | Age: 67
End: 2022-10-24

## 2022-10-24 ENCOUNTER — TELEPHONE (OUTPATIENT)
Dept: CARDIOLOGY | Facility: CLINIC | Age: 67
End: 2022-10-24

## 2022-10-24 DIAGNOSIS — R07.9 CHEST PAIN, UNSPECIFIED TYPE: Primary | ICD-10-CM

## 2022-10-24 DIAGNOSIS — R07.89 OTHER CHEST PAIN: Primary | ICD-10-CM

## 2022-10-24 NOTE — TELEPHONE ENCOUNTER
Caller: LAMBERT     Relationship to patient: SELF    Best call back number: [phone] YOU CAN LEAVE A MESSAGE    Patient is needing: PATIENT ADVISED YOU ALL WERE GIVING HIM SAMPLES OF A BLOOD THINNER BUT HE CANNOT REMEMBER THE NAME DUE TO INSURANCE CHARGING OVER $400 PER MONTH. HE IS WANTING TO KNOW IF HE CAN GET MORE SAMPLES HE HAS 3 DAYS. REMAINING.

## 2022-10-27 NOTE — PROGRESS NOTES
A user error has taken place: encounter opened in error, closed for administrative reasons, orders placed in error, not carried out on this patient, medication ordered in error, not dispensed to this patient, charting done on wrong patient and has been corrected..     Please follow up with eye clinic for further evaluation.       Patient Education    [employer] HANDOUT- PARENT  5 YEAR VISIT  Here are some suggestions from [employer] experts that may be of value to your family.     HOW YOUR FAMILY IS DOING  Spend time with your child. Hug and praise him.  Help your child do things for himself.  Help your child deal with conflict.  If you are worried about your living or food situation, talk with us. Community agencies and programs such as [employer] can also provide information and assistance.  Don t smoke or use e-cigarettes. Keep your home and car smoke-free. Tobacco-free spaces keep children healthy.  Don t use alcohol or drugs. If you re worried about a family member s use, let us know, or reach out to local or online resources that can help.    STAYING HEALTHY  Help your child brush his teeth twice a day  After breakfast  Before bed  Use a pea-sized amount of toothpaste with fluoride.  Help your child floss his teeth once a day.  Your child should visit the dentist at least twice a year.  Help your child be a healthy eater by  Providing healthy foods, such as vegetables, fruits, lean protein, and whole grains  Eating together as a family  Being a role model in what you eat  Buy fat-free milk and low-fat dairy foods. Encourage 2 to 3 servings each day.  Limit candy, soft drinks, juice, and sugary foods.  Make sure your child is active for 1 hour or more daily.  Don t put a TV in your child s bedroom.  Consider making a family media plan. It helps you make rules for media use and balance screen time with other activities, including exercise.    FAMILY RULES AND ROUTINES  Family routines create a sense of safety and security for your child.  Teach your child what is right and what is wrong.  Give your child chores to do and expect them to be done.  Use discipline to teach, not to punish.  Help your child deal with anger. Be a role model.  Teach your child to walk away when she  is angry and do something else to calm down, such as playing or reading.    READY FOR SCHOOL  Talk to your child about school.  Read books with your child about starting school.  Take your child to see the school and meet the teacher.  Help your child get ready to learn. Feed her a healthy breakfast and give her regular bedtimes so she gets at least 10 to 11 hours of sleep.  Make sure your child goes to a safe place after school.  If your child has disabilities or special health care needs, be active in the Individualized Education Program process.    SAFETY  Your child should always ride in the back seat (until at least 13 years of age) and use a forward-facing car safety seat or belt-positioning booster seat.  Teach your child how to safely cross the street and ride the school bus. Children are not ready to cross the street alone until 10 years or older.  Provide a properly fitting helmet and safety gear for riding scooters, biking, skating, in-line skating, skiing, snowboarding, and horseback riding.  Make sure your child learns to swim. Never let your child swim alone.  Use a hat, sun protection clothing, and sunscreen with SPF of 15 or higher on his exposed skin. Limit time outside when the sun is strongest (11:00 am-3:00 pm).  Teach your child about how to be safe with other adults.  No adult should ask a child to keep secrets from parents.  No adult should ask to see a child s private parts.  No adult should ask a child for help with the adult s own private parts.  Have working smoke and carbon monoxide alarms on every floor. Test them every month and change the batteries every year. Make a family escape plan in case of fire in your home.  If it is necessary to keep a gun in your home, store it unloaded and locked with the ammunition locked separately from the gun.  Ask if there are guns in homes where your child plays. If so, make sure they are stored safely.        Helpful Resources:  Family Media Use Plan:  www.healthychildren.org/MediaUsePlan  Smoking Quit Line: 703.913.6962 Information About Car Safety Seats: www.safercar.gov/parents  Toll-free Auto Safety Hotline: 294.945.5373  Consistent with Bright Futures: Guidelines for Health Supervision of Infants, Children, and Adolescents, 4th Edition  For more information, go to https://brightfutures.aap.org.

## 2022-11-07 ENCOUNTER — TELEPHONE (OUTPATIENT)
Dept: CARDIOLOGY | Facility: CLINIC | Age: 67
End: 2022-11-07

## 2022-11-07 NOTE — TELEPHONE ENCOUNTER
Caller: LAMBERT    Relationship: SELF    Best call back number: [phone]  What is the best time to reach you: ANY        What was the call regarding: XARELTO 20 MG-  THREE DAYS LEFT OF MEDICATION- WOULD LIKE TO KNOW IF HE CAN COME BY AND GET SAMPLES.    Do you require a callback: YES

## 2022-11-21 ENCOUNTER — TELEPHONE (OUTPATIENT)
Dept: CARDIOLOGY | Facility: CLINIC | Age: 67
End: 2022-11-21

## 2022-11-21 NOTE — TELEPHONE ENCOUNTER
Caller: Wade Mack    Relationship: Self    Best call back number: [phone]    What is the best time to reach you: ANYTIME    What was the call regarding: PATIENT WOULD LIKE TO KNOW IF WE HAVE ANY XARELTO 20MG SAMPLES HE CAN COME AND . PATIENT HAS 3 DAYS REMAINING.     Do you require a callback: YES

## 2022-12-15 ENCOUNTER — OFFICE VISIT (OUTPATIENT)
Dept: CARDIOLOGY | Facility: CLINIC | Age: 67
End: 2022-12-15

## 2022-12-15 VITALS
OXYGEN SATURATION: 98 % | WEIGHT: 315 LBS | DIASTOLIC BLOOD PRESSURE: 84 MMHG | BODY MASS INDEX: 42.66 KG/M2 | SYSTOLIC BLOOD PRESSURE: 124 MMHG | HEIGHT: 72 IN | HEART RATE: 103 BPM

## 2022-12-15 DIAGNOSIS — I48.91 ATRIAL FIBRILLATION, UNSPECIFIED TYPE: Primary | ICD-10-CM

## 2022-12-15 DIAGNOSIS — I10 PRIMARY HYPERTENSION: ICD-10-CM

## 2022-12-15 DIAGNOSIS — R06.02 SOB (SHORTNESS OF BREATH): ICD-10-CM

## 2022-12-15 PROCEDURE — 99213 OFFICE O/P EST LOW 20 MIN: CPT | Performed by: PHYSICIAN ASSISTANT

## 2022-12-15 RX ORDER — METHOCARBAMOL 750 MG/1
750 TABLET, FILM COATED ORAL 4 TIMES DAILY PRN
COMMUNITY

## 2022-12-15 NOTE — PROGRESS NOTES
Subjective   Wade Mack is a 67 y.o. male     Chief Complaint   Patient presents with   • Follow-up     DILATED CARDIOMYOPATHY   Problem list:     1.  Atrial fibrillation.  1.1.  Managed currently with rate control and anticoagulation therapy.  2.  Dilated cardiomyopathy, possible tachycardia induced cardiomyopathy.  2.1.  Estimated EF at 40 to 45% per ventriculography during catheterization, 11/2021.  2.2.  Repeat echocardiogram, 6/2022, supporting an EF of 50 to 55%.  3.  Minor nonobstructive CAD per catheterization, 11/2021.  4.  Hypertension  5.  Dyslipidemia    HPI    The patient presents in the clinic today for routine evaluation and follow-up.  Since last evaluation here, the patient is continued to do well.  We wanted to repeat an echocardiogram to reevaluate systolic function on cardioprotective medical regimen, as well as to reevaluate the same given improved control of A. fib.  Since being on metoprolol, A. fib has remained very well controlled.  Echo supports improvement in EF now to 50 to 55%.  We have discussed this in detail with him.  Cardiac parameters otherwise were very much stable.  Clinically, the patient is doing well.  He has no chest pain.  He has stable dyspnea.  Infrequently does he note episodes of palpitations.  He has no sustained dysrhythmic activity.  The patient denies failure symptoms.  He typically is normotensive when checked at home.  He is tolerating medications without complications.  He has no further complaints otherwise.    Current Outpatient Medications   Medication Sig Dispense Refill   • allopurinol (ZYLOPRIM) 300 MG tablet      • atorvastatin (LIPITOR) 20 MG tablet Take 1 tablet by mouth Daily. 90 tablet 3   • bumetanide (BUMEX) 1 MG tablet bumetanide 1 mg tablet     • lisinopril (PRINIVIL,ZESTRIL) 2.5 MG tablet Take 1 tablet by mouth Daily. 90 tablet 3   • methocarbamol (ROBAXIN) 750 MG tablet Take 750 mg by mouth 4 (Four) Times a Day As Needed for Muscle Spasms.      • metoprolol succinate XL (TOPROL-XL) 50 MG 24 hr tablet Take 1 tablet by mouth 2 (Two) Times a Day. 60 tablet 11   • nitroglycerin (NITROSTAT) 0.4 MG SL tablet 1 under the tongue as needed for angina, may repeat q5mins for up three doses 25 tablet 2   • oxyCODONE (ROXICODONE) 10 MG tablet Take 10 mg by mouth 4 (Four) Times a Day.     • rivaroxaban (Xarelto) 20 MG tablet Take 1 tablet by mouth Daily. 70 tablet 0     No current facility-administered medications for this visit.       Patient has no known allergies.    Past Medical History:   Diagnosis Date   • Arthritis    • Diabetes mellitus (HCC)    • Gout    • Hypertension    • Low back pain        Social History     Socioeconomic History   • Marital status:    Tobacco Use   • Smoking status: Former     Packs/day: 3.00     Types: Cigarettes     Quit date: 1995     Years since quittin.0   • Smokeless tobacco: Never   Substance and Sexual Activity   • Alcohol use: Not Currently     Comment: Former use   • Drug use: Never   • Sexual activity: Defer       Family History   Problem Relation Age of Onset   • Hypertension Father    • Arthritis Father        Review of Systems   Constitutional: Negative.  Negative for activity change, appetite change, chills, fatigue and fever.   HENT: Negative.  Negative for congestion.    Eyes: Negative.  Negative for visual disturbance.   Respiratory: Positive for apnea (cpap). Negative for cough, shortness of breath and wheezing.    Cardiovascular: Negative for chest pain, palpitations and leg swelling.   Gastrointestinal: Negative.  Negative for blood in stool.   Endocrine: Negative.  Negative for cold intolerance and heat intolerance.   Genitourinary: Negative.  Negative for hematuria.   Musculoskeletal: Positive for arthralgias, back pain, gait problem and joint swelling. Negative for neck pain and neck stiffness.   Skin: Negative.  Negative for color change, rash and wound.   Allergic/Immunologic: Negative.   "Negative for environmental allergies and food allergies.   Neurological: Positive for numbness (ELYSE LEGS WITH STANDING TOO LONG.).   Hematological: Bruises/bleeds easily (BRUISE).   Psychiatric/Behavioral: Negative.  Negative for sleep disturbance.       Objective     Vitals:    12/15/22 0947   BP: 124/84   BP Location: Left arm   Patient Position: Sitting   Cuff Size: Adult   Pulse: 103   SpO2: 98%   Weight: (!) 154 kg (339 lb)   Height: 182.9 cm (72\")        /84 (BP Location: Left arm, Patient Position: Sitting, Cuff Size: Adult)   Pulse 103   Ht 182.9 cm (72\")   Wt (!) 154 kg (339 lb)   SpO2 98%   BMI 45.98 kg/m²      Lab Results (most recent)     None          Physical Exam  Vitals and nursing note reviewed.   Constitutional:       General: He is not in acute distress.     Appearance: He is well-developed.   HENT:      Head: Normocephalic and atraumatic.   Eyes:      Conjunctiva/sclera: Conjunctivae normal.      Pupils: Pupils are equal, round, and reactive to light.   Neck:      Vascular: No JVD.      Trachea: No tracheal deviation.   Cardiovascular:      Rate and Rhythm: Normal rate. Rhythm irregular.      Heart sounds: Normal heart sounds.   Pulmonary:      Effort: Pulmonary effort is normal.      Breath sounds: Normal breath sounds.   Abdominal:      General: Bowel sounds are normal. There is no distension.      Palpations: Abdomen is soft. There is no mass.      Tenderness: There is no abdominal tenderness. There is no guarding or rebound.   Musculoskeletal:         General: No tenderness or deformity. Normal range of motion.      Cervical back: Normal range of motion and neck supple.   Skin:     General: Skin is warm and dry.      Coloration: Skin is not pale.      Findings: No erythema or rash.   Neurological:      Mental Status: He is alert and oriented to person, place, and time.   Psychiatric:         Behavior: Behavior normal.         Thought Content: Thought content normal.         " Judgment: Judgment normal.         Procedure   Procedures         Assessment & Plan      Diagnosis Plan   1. Atrial fibrillation, unspecified type (HCC)        2. SOB (shortness of breath)        3. Primary hypertension          1.  At this time, the patient is doing well.  A. fib is still well controlled with metoprolol for rate control and anticoagulation therapy otherwise.  I will continue medical regimen directed at A. fib and medications otherwise without change.    2.  EF has improved, now noted to be within normal limits at 50 to 55% by recent echo.  We reviewed this in detail with the patient today.    3.  Clinically, the patient is doing well.  Dyspnea remains at baseline.  He really has no further cardiovascular symptoms or complications.  For change in clinical course he will return to clinic immediately.    4.  Blood pressures are well controlled as well.  He will monitor that and call for complications.    5.  Nothing further at this time and we will continue to see him on 6-month intervals.  He is very much stable from general cardiovascular standpoint at this time.         Patient brought in medicine list to appointment, it's been reviewed with patient and med list was updated in the chart.     Advance Care Planning   ACP discussion was declined by the patient. Patient has an advance directive in EMR which is still valid.  Patient does not have an advance directive, declines further assistance.           Electronically signed by:

## 2022-12-20 ENCOUNTER — TELEPHONE (OUTPATIENT)
Dept: CARDIOLOGY | Facility: CLINIC | Age: 67
End: 2022-12-20

## 2022-12-20 NOTE — TELEPHONE ENCOUNTER
Left detailed message that Xarelto samples are put up for patient. We are open this week, Friday we close at noon.

## 2023-01-13 ENCOUNTER — TELEPHONE (OUTPATIENT)
Dept: CARDIOLOGY | Facility: CLINIC | Age: 68
End: 2023-01-13
Payer: MEDICARE

## 2023-01-13 NOTE — TELEPHONE ENCOUNTER
Pt LVM on triage requesting Xarelto samples.       Per chart review, he gets 20mg. I set aside samples for pt to P/U and LVM to notify pt. Did mention that we close at noon today and will hold samples for 7 days.     For the HUB to read to pt:   Samples set aside for pt.

## 2023-02-08 ENCOUNTER — TELEPHONE (OUTPATIENT)
Dept: CARDIOLOGY | Facility: CLINIC | Age: 68
End: 2023-02-08
Payer: MEDICARE

## 2023-02-08 NOTE — TELEPHONE ENCOUNTER
[employer] gue[employer] called to report they no longer offer pt assistance for Xarelto if the pt has insurance.  Pt can call [phone] to apply for assistance.  The PM called to notify the pt and provide this number.  Samples set aside for the pt and he is supposed to call our office with an update after he speaks with someone @ [facility] regarding this assistance option.

## 2023-02-10 DIAGNOSIS — I48.91 ATRIAL FIBRILLATION, UNSPECIFIED TYPE: ICD-10-CM

## 2023-02-10 RX ORDER — METOPROLOL SUCCINATE 50 MG/1
50 TABLET, EXTENDED RELEASE ORAL 2 TIMES DAILY
Qty: 180 TABLET | Refills: 3 | Status: SHIPPED | OUTPATIENT
Start: 2023-02-10

## 2023-02-10 RX ORDER — LISINOPRIL 2.5 MG/1
2.5 TABLET ORAL DAILY
Qty: 90 TABLET | Refills: 3 | Status: SHIPPED | OUTPATIENT
Start: 2023-02-10

## 2023-03-10 ENCOUNTER — TELEPHONE (OUTPATIENT)
Dept: CARDIOLOGY | Facility: CLINIC | Age: 68
End: 2023-03-10
Payer: MEDICARE

## 2023-03-10 NOTE — TELEPHONE ENCOUNTER
Caller: Wade Mack    Relationship: Self    Best call back number: [phone]    What is the best time to reach you: ANY    Who are you requesting to speak with (clinical staff, provider,  specific staff member): CLINICAL      What was the call regarding: PT WAS WANTING TO SEE IF THERE WAS ANY XARELTO 20MG SAMPLES HE COULD  TODAY.    Do you require a callback: YES

## 2023-03-29 ENCOUNTER — PRIOR AUTHORIZATION (OUTPATIENT)
Dept: CARDIOLOGY | Facility: CLINIC | Age: 68
End: 2023-03-29
Payer: MEDICARE

## 2023-03-29 ENCOUNTER — TELEPHONE (OUTPATIENT)
Dept: CARDIOLOGY | Facility: CLINIC | Age: 68
End: 2023-03-29
Payer: MEDICARE

## 2023-04-10 ENCOUNTER — TELEPHONE (OUTPATIENT)
Dept: CARDIOLOGY | Facility: CLINIC | Age: 68
End: 2023-04-10
Payer: MEDICARE

## 2023-04-10 NOTE — TELEPHONE ENCOUNTER
Patient left message for samples of Xarelto.   Left detailed message for patient that samples are available and we will hold 7 days.   
oral

## 2023-04-11 ENCOUNTER — TELEPHONE (OUTPATIENT)
Dept: CARDIOLOGY | Facility: CLINIC | Age: 68
End: 2023-04-11
Payer: MEDICARE

## 2023-04-14 RX ORDER — LISINOPRIL 2.5 MG/1
TABLET ORAL
Qty: 90 TABLET | Refills: 0 | Status: SHIPPED | OUTPATIENT
Start: 2023-04-14

## 2023-04-21 ENCOUNTER — TELEPHONE (OUTPATIENT)
Dept: CARDIOLOGY | Facility: CLINIC | Age: 68
End: 2023-04-21
Payer: MEDICARE

## 2023-04-21 NOTE — TELEPHONE ENCOUNTER
I called patient to let him know that we need income information for patient assistance.  I left detailed message on voicemail.      Anum ARCE

## 2023-04-26 ENCOUNTER — TELEPHONE (OUTPATIENT)
Dept: CARDIOLOGY | Facility: CLINIC | Age: 68
End: 2023-04-26
Payer: MEDICARE

## 2023-04-26 NOTE — TELEPHONE ENCOUNTER
Patient left message requesting samples of Xarelo 20.   Left message for patient that samples are available for pickup and we will hold 7 days.

## 2023-04-27 ENCOUNTER — TELEPHONE (OUTPATIENT)
Dept: CARDIOLOGY | Facility: CLINIC | Age: 68
End: 2023-04-27
Payer: MEDICARE

## 2023-04-27 NOTE — TELEPHONE ENCOUNTER
Wade picked up 2 bottles of Xarelto 20 mg (samples) on 4/27/2023. T.P.  Provider: Gagan Post    Lot#:[id number]  EX:10/24

## 2023-04-27 NOTE — TELEPHONE ENCOUNTER
Pt dropped off pt assistance paperwork for Anum. I made pt aware that we will place it in the assistance's box for completion. I confirmed that financial documents were included with the assistance forms.    I placed the paperwork in Anum's box to review and complete for pt.   Please document when the forms are sent into the assistance program for pt, thank you!

## 2023-05-25 ENCOUNTER — TELEPHONE (OUTPATIENT)
Dept: CARDIOLOGY | Facility: CLINIC | Age: 68
End: 2023-05-25
Payer: MEDICARE

## 2023-05-25 NOTE — TELEPHONE ENCOUNTER
Wade picked up 2 bottles(samples) of Xarelto 20 mg. On 5/25/2023. T.P.    Provider:Gagan Post    Lot#:[id number]  EX:3/25

## 2023-06-05 DIAGNOSIS — R06.02 SOB (SHORTNESS OF BREATH): ICD-10-CM

## 2023-06-05 RX ORDER — ATORVASTATIN CALCIUM 20 MG/1
TABLET, FILM COATED ORAL
Qty: 90 TABLET | Refills: 0 | Status: SHIPPED | OUTPATIENT
Start: 2023-06-05

## 2023-06-06 ENCOUNTER — TELEPHONE (OUTPATIENT)
Dept: CARDIOLOGY | Facility: CLINIC | Age: 68
End: 2023-06-06
Payer: MEDICARE

## 2023-06-06 NOTE — TELEPHONE ENCOUNTER
Pt LVM requesting more Xarelto 20mg samples.     Set aside samples for pt, as requested.       First attempt to reach pt. Left a voicemail stating samples are set aside for him to P/U.

## 2023-06-07 ENCOUNTER — TELEPHONE (OUTPATIENT)
Dept: CARDIOLOGY | Facility: CLINIC | Age: 68
End: 2023-06-07
Payer: MEDICARE

## 2023-06-07 NOTE — TELEPHONE ENCOUNTER
Wade picked up Xarelto 20 mg. 2 bottles(samples) on 6/7/2023.T.P.    Provider:Gagan Post    Lot#:[id number]  Ex:3/25

## 2023-07-25 ENCOUNTER — TELEPHONE (OUTPATIENT)
Dept: CARDIOLOGY | Facility: CLINIC | Age: 68
End: 2023-07-25
Payer: MEDICARE

## 2023-07-25 NOTE — TELEPHONE ENCOUNTER
Patient left message for samples of Xarelto. He would like Roxie Mack to  tomorrow. She has appointment with Dr Sharma.     Left message that samples are available and Roxie can  tomorrow.

## 2023-07-26 ENCOUNTER — TELEPHONE (OUTPATIENT)
Dept: CARDIOLOGY | Facility: CLINIC | Age: 68
End: 2023-07-26
Payer: MEDICARE

## 2023-07-26 NOTE — TELEPHONE ENCOUNTER
Patient's wife Roxie picked up the 2 bottles of Xarelto 20MG on 07/26 that was put back for patient.  Paul Patient   Lot: [id number]  Exp: 08/2025

## 2023-08-03 ENCOUNTER — TELEPHONE (OUTPATIENT)
Dept: CARDIOLOGY | Facility: CLINIC | Age: 68
End: 2023-08-03
Payer: MEDICARE

## 2023-08-04 ENCOUNTER — TELEPHONE (OUTPATIENT)
Dept: CARDIOLOGY | Facility: CLINIC | Age: 68
End: 2023-08-04
Payer: MEDICARE

## 2023-08-16 ENCOUNTER — TELEPHONE (OUTPATIENT)
Dept: CARDIOLOGY | Facility: CLINIC | Age: 68
End: 2023-08-16
Payer: MEDICARE

## 2023-08-18 ENCOUNTER — TELEPHONE (OUTPATIENT)
Dept: CARDIOLOGY | Facility: CLINIC | Age: 68
End: 2023-08-18
Payer: MEDICARE

## 2023-08-18 NOTE — TELEPHONE ENCOUNTER
Wade picked up samples (2bottles) of Xarelto 20 mg. On 8/18/2023.T.P.     Provider:Gagan Post    Lot#:[id number]  EX:8/25

## 2023-08-31 DIAGNOSIS — I48.91 ATRIAL FIBRILLATION, UNSPECIFIED TYPE: ICD-10-CM

## 2023-08-31 DIAGNOSIS — R06.02 SOB (SHORTNESS OF BREATH): ICD-10-CM

## 2023-09-01 ENCOUNTER — TELEPHONE (OUTPATIENT)
Dept: CARDIOLOGY | Facility: CLINIC | Age: 68
End: 2023-09-01
Payer: MEDICARE

## 2023-09-01 RX ORDER — ATORVASTATIN CALCIUM 20 MG/1
TABLET, FILM COATED ORAL
Qty: 90 TABLET | Refills: 3 | Status: SHIPPED | OUTPATIENT
Start: 2023-09-01

## 2023-09-01 NOTE — TELEPHONE ENCOUNTER
Pended orders from yesterday sent to another provider as Gagan Post PA-C is out of office today. Notified patient samples will be able to be picked up today.

## 2023-09-01 NOTE — TELEPHONE ENCOUNTER
"  Caller: Wade Mack \"Ye\"    Relationship: Self    Best call back number: [phone]    What is the best time to reach you: ANY    Who are you requesting to speak with (clinical staff, provider,  specific staff member): CLI        What was the call regarding: PT IS CALLING TO INQUIRE ABOUT XARELTO 20MG SAMPLES. HE HAS ROUGHLY 5 DAYS LEFT. PLEASE REACH OUT TO DISCUSS PICKING SOME UP.       "

## 2023-09-14 DIAGNOSIS — I48.91 ATRIAL FIBRILLATION, UNSPECIFIED TYPE: ICD-10-CM

## 2023-09-19 DIAGNOSIS — I48.91 ATRIAL FIBRILLATION, UNSPECIFIED TYPE: ICD-10-CM

## 2023-10-10 DIAGNOSIS — I48.91 ATRIAL FIBRILLATION, UNSPECIFIED TYPE: ICD-10-CM

## 2023-11-01 DIAGNOSIS — I48.91 ATRIAL FIBRILLATION, UNSPECIFIED TYPE: ICD-10-CM

## 2023-11-01 NOTE — TELEPHONE ENCOUNTER
Patient request samples of Xarelto. Advised samples will be available downstairs in the basement.

## 2023-11-14 DIAGNOSIS — I48.91 ATRIAL FIBRILLATION, UNSPECIFIED TYPE: ICD-10-CM

## 2023-11-14 NOTE — TELEPHONE ENCOUNTER
Patient called for samples. Advised samples will be ready for  this afternoon. He will  tomorrow.

## 2024-01-03 DIAGNOSIS — I48.91 ATRIAL FIBRILLATION, UNSPECIFIED TYPE: ICD-10-CM

## 2024-01-03 NOTE — TELEPHONE ENCOUNTER
Patient request samples of Xarelto. Left message that samples will be ready for  this afternoon or any day this week.

## 2024-02-16 DIAGNOSIS — I48.91 ATRIAL FIBRILLATION, UNSPECIFIED TYPE: ICD-10-CM

## 2024-02-27 ENCOUNTER — TELEPHONE (OUTPATIENT)
Dept: CARDIOLOGY | Facility: CLINIC | Age: 69
End: 2024-02-27
Payer: MEDICARE

## 2024-02-27 DIAGNOSIS — I48.91 ATRIAL FIBRILLATION, UNSPECIFIED TYPE: ICD-10-CM

## 2024-02-27 RX ORDER — METOPROLOL SUCCINATE 50 MG/1
50 TABLET, EXTENDED RELEASE ORAL 2 TIMES DAILY
Qty: 60 TABLET | Refills: 0 | Status: SHIPPED | OUTPATIENT
Start: 2024-02-27 | End: 2024-02-29

## 2024-02-27 NOTE — TELEPHONE ENCOUNTER
Patient called in wanting samples of Xarelto 20 mg po QD. I put order in for this and will set samples aside.

## 2024-02-27 NOTE — TELEPHONE ENCOUNTER
"Relay     \"Calling to remind you of your appointment with Gagan Post on Thursday 02/29/2024. Gagan is now located in the basement. You will drive around and park in the back of the building, come in the door, and turn right. Please bring your insurance cards, ID, and an updated medication list or your bottles. If you've had any blood work or been in the hospital or ER since your last visit, we need to know when and where so we can obtain those records. Thank you\"          "

## 2024-02-27 NOTE — TELEPHONE ENCOUNTER
Caller: LAMBERT DENISE    Relationship:    Callback number: [phone]   Is it ok to leave a message: [x] Yes [] No    Requested medication for samples: XARELTO    How much medication does the patient currently have left: 4-5 DAYS    Who will be picking up the samples: LAMBERT    Do you need information about patient financial assistance for this medication: [] Yes [x] No    Additional details provided: CAN  AT NEXT APPT ON 2.29.2024 CALL TO ADVISE

## 2024-02-29 ENCOUNTER — OFFICE VISIT (OUTPATIENT)
Dept: CARDIOLOGY | Facility: CLINIC | Age: 69
End: 2024-02-29
Payer: MEDICARE

## 2024-02-29 VITALS
DIASTOLIC BLOOD PRESSURE: 88 MMHG | WEIGHT: 315 LBS | OXYGEN SATURATION: 96 % | SYSTOLIC BLOOD PRESSURE: 132 MMHG | BODY MASS INDEX: 42.66 KG/M2 | HEART RATE: 84 BPM | HEIGHT: 72 IN

## 2024-02-29 DIAGNOSIS — I48.91 ATRIAL FIBRILLATION, UNSPECIFIED TYPE: Primary | ICD-10-CM

## 2024-02-29 DIAGNOSIS — I48.91 ATRIAL FIBRILLATION, UNSPECIFIED TYPE: ICD-10-CM

## 2024-02-29 DIAGNOSIS — R06.02 SOB (SHORTNESS OF BREATH): ICD-10-CM

## 2024-02-29 DIAGNOSIS — I10 PRIMARY HYPERTENSION: ICD-10-CM

## 2024-02-29 PROCEDURE — 99213 OFFICE O/P EST LOW 20 MIN: CPT | Performed by: PHYSICIAN ASSISTANT

## 2024-02-29 PROCEDURE — 1160F RVW MEDS BY RX/DR IN RCRD: CPT | Performed by: PHYSICIAN ASSISTANT

## 2024-02-29 PROCEDURE — 1159F MED LIST DOCD IN RCRD: CPT | Performed by: PHYSICIAN ASSISTANT

## 2024-02-29 RX ORDER — METOPROLOL SUCCINATE 50 MG/1
50 TABLET, EXTENDED RELEASE ORAL 2 TIMES DAILY
Qty: 180 TABLET | Refills: 0 | Status: SHIPPED | OUTPATIENT
Start: 2024-02-29

## 2024-02-29 RX ORDER — OLMESARTAN MEDOXOMIL 5 MG/1
5 TABLET ORAL DAILY
COMMUNITY
Start: 2023-12-28

## 2024-02-29 NOTE — PROGRESS NOTES
Problem list     Subjective   Wade Mack is a 68 y.o. male     Chief Complaint   Patient presents with    Follow-up     6 month follow up    Shortness of Breath     With activity    Leg Swelling   Problem list:     1.  Atrial fibrillation.  1.1.  Managed currently with rate control and anticoagulation therapy.  2.  Dilated cardiomyopathy, possible tachycardia induced cardiomyopathy.  2.1.  Estimated EF at 40 to 45% per ventriculography during catheterization, 11/2021.  2.2.  Repeat echocardiogram, 6/2022, supporting an EF of 50 to 55%.  3.  Minor nonobstructive CAD per catheterization, 11/2021.  4.  Hypertension  5.  Dyslipidemia       HPI  The patient presents in the clinic today for routine evaluation and follow-up.  For the most part, the patient is done fairly well from general cardiovascular standpoint since last evaluation.  He has had extensive workup for dilated cardiomyopathy, all as outlined above.  He eventually did have a catheterization which indicated minor nonobstructive CAD.  EF by recent echo had improved to 55%, ±5%.  This was up from 40% by initial evaluation.  He currently denies any angina.  Dyspnea is at baseline.  He has no failure symptoms.  He does not sense A-fib at this time.  He has no further complaints.    Current Outpatient Medications on File Prior to Visit   Medication Sig Dispense Refill    allopurinol (ZYLOPRIM) 300 MG tablet       atorvastatin (LIPITOR) 20 MG tablet Take 1 tablet by mouth once daily 90 tablet 3    bumetanide (BUMEX) 1 MG tablet bumetanide 1 mg tablet      nitroglycerin (NITROSTAT) 0.4 MG SL tablet 1 under the tongue as needed for angina, may repeat q5mins for up three doses 25 tablet 2    olmesartan (BENICAR) 5 MG tablet Take 1 tablet by mouth Daily.      oxyCODONE (ROXICODONE) 10 MG tablet Take 1 tablet by mouth 4 (Four) Times a Day.      rivaroxaban (Xarelto) 20 MG tablet Take 1 tablet by mouth Daily. 14 tablet 0    [DISCONTINUED] Trulicity 0.75 MG/0.5ML  solution pen-injector       [DISCONTINUED] lisinopril (PRINIVIL,ZESTRIL) 2.5 MG tablet Take 1 tablet by mouth once daily 90 tablet 0    [DISCONTINUED] metoprolol succinate XL (TOPROL-XL) 50 MG 24 hr tablet Take 1 tablet by mouth twice daily 60 tablet 0     No current facility-administered medications on file prior to visit.       Patient has no known allergies.    Past Medical History:   Diagnosis Date    Arthritis     Diabetes mellitus     Gout     Hypertension     Low back pain        Social History     Socioeconomic History    Marital status:    Tobacco Use    Smoking status: Former     Packs/day: 3     Types: Cigarettes     Quit date: 1995     Years since quittin.2    Smokeless tobacco: Never   Substance and Sexual Activity    Alcohol use: Not Currently     Comment: Former use    Drug use: Never    Sexual activity: Defer       Family History   Problem Relation Age of Onset    Hypertension Father     Arthritis Father        Review of Systems   Constitutional:  Positive for fatigue. Negative for chills, diaphoresis and fever.   HENT: Negative.     Eyes: Negative.  Negative for visual disturbance.   Respiratory:  Positive for apnea (wears c-pap), cough, shortness of breath and wheezing. Negative for chest tightness.    Cardiovascular:  Positive for leg swelling. Negative for chest pain and palpitations.   Gastrointestinal: Negative.  Negative for abdominal pain and blood in stool.   Endocrine: Negative.    Genitourinary: Negative.  Negative for hematuria.   Musculoskeletal:  Positive for arthralgias, back pain, myalgias, neck pain and neck stiffness.   Skin: Negative.  Negative for rash and wound.   Allergic/Immunologic: Negative.  Negative for environmental allergies and food allergies.   Neurological:  Positive for numbness (legs). Negative for dizziness, syncope, weakness, light-headedness and headaches.   Hematological:  Bruises/bleeds easily (bruises easily).   Psychiatric/Behavioral:   "Positive for sleep disturbance (sleep apnea).        Objective   Vitals:    24 1354 24 1405   BP: 151/90 132/88   Pulse: 78 84   SpO2: 96%    Weight: (!) 167 kg (369 lb)    Height: 182.9 cm (72\")       /88   Pulse 84   Ht 182.9 cm (72\")   Wt (!) 167 kg (369 lb)   SpO2 96%   BMI 50.05 kg/m²    Lab Results (most recent)       None          Physical Exam  Vitals and nursing note reviewed.   Constitutional:       General: He is not in acute distress.     Appearance: He is well-developed.   HENT:      Head: Normocephalic and atraumatic.   Eyes:      Conjunctiva/sclera: Conjunctivae normal.      Pupils: Pupils are equal, round, and reactive to light.   Neck:      Vascular: No JVD.      Trachea: No tracheal deviation.   Cardiovascular:      Rate and Rhythm: Normal rate. Rhythm irregular.      Heart sounds: Normal heart sounds.   Pulmonary:      Effort: Pulmonary effort is normal.      Breath sounds: Normal breath sounds.   Abdominal:      General: There is no distension.      Palpations: There is no mass.      Tenderness: There is no abdominal tenderness. There is no guarding or rebound.   Musculoskeletal:         General: No tenderness or deformity. Normal range of motion.      Cervical back: Normal range of motion and neck supple.      Right lower le+ Edema present.      Left lower le+ Edema present.   Skin:     General: Skin is warm and dry.      Coloration: Skin is not pale.      Findings: No erythema or rash.   Neurological:      Mental Status: He is alert and oriented to person, place, and time.   Psychiatric:         Behavior: Behavior normal.         Thought Content: Thought content normal.         Judgment: Judgment normal.           Procedure   Procedures       Assessment & Plan      Diagnosis Plan   1. Atrial fibrillation, unspecified type        2. SOB (shortness of breath)        3. Primary hypertension            1.  The patient presents in for routine evaluation follow-up.  " A-fib continues to be well-controlled with rate suppressive therapy and anticoagulation.  I will continue the medical regimen without change.    2.  Most recent echo supports now normalized systolic function.  Initial EF, at time of his first evaluation, was at 40%.  He is now at 50 to 55%.  I feel he has responded well with regards to systolic function with control of A-fib and cardioprotective regimen.  Again, no adjustments in medications will be made.    3.  Blood pressures continue to be very well-controlled.  He will monitor this closely now.    4.  Nothing further for now and we will continue to see the patient on routine 6-month intervals, sooner for complications.     Patient did not bring med list or medicine bottles to appointment, med list has been reviewed and updated based on patient's knowledge of their meds.      Advance Care Planning   ACP discussion was held with the patient during this visit. Patient does not have an advance directive, declines further assistance.           Electronically signed by:

## 2024-03-08 DIAGNOSIS — R06.02 SOB (SHORTNESS OF BREATH): ICD-10-CM

## 2024-03-08 RX ORDER — ATORVASTATIN CALCIUM 20 MG/1
TABLET, FILM COATED ORAL
Qty: 90 TABLET | Refills: 0 | Status: SHIPPED | OUTPATIENT
Start: 2024-03-08

## 2024-03-20 DIAGNOSIS — I48.91 ATRIAL FIBRILLATION, UNSPECIFIED TYPE: ICD-10-CM

## 2024-04-04 ENCOUNTER — TELEPHONE (OUTPATIENT)
Dept: CARDIOLOGY | Facility: CLINIC | Age: 69
End: 2024-04-04
Payer: MEDICARE

## 2024-04-04 DIAGNOSIS — I48.91 ATRIAL FIBRILLATION, UNSPECIFIED TYPE: ICD-10-CM

## 2024-04-04 NOTE — TELEPHONE ENCOUNTER
Set aside requested samples for pt. Sending sample order to provider to sign. Will contact pt, once order is signed and samples are ready for P/U.

## 2024-04-17 DIAGNOSIS — I48.91 ATRIAL FIBRILLATION, UNSPECIFIED TYPE: ICD-10-CM

## 2024-04-17 NOTE — TELEPHONE ENCOUNTER
Patient left message for samples of Xarelto.     Samples set aside and order placed for provider signature. Patient to  tomorrow.

## 2024-05-01 DIAGNOSIS — I48.91 ATRIAL FIBRILLATION, UNSPECIFIED TYPE: ICD-10-CM

## 2024-05-01 NOTE — TELEPHONE ENCOUNTER
Patient called in requesting samples of xarelto 20 mg daily has enough to get through to this Saturday.     Samples prepared for pickup in basement. Order entered.

## 2024-05-14 DIAGNOSIS — I48.91 ATRIAL FIBRILLATION, UNSPECIFIED TYPE: ICD-10-CM

## 2024-05-14 NOTE — TELEPHONE ENCOUNTER
Patient called for samples of Xarelto. Samples set aside upstairs. He will  near the end of the week.

## 2024-06-01 DIAGNOSIS — R06.02 SOB (SHORTNESS OF BREATH): ICD-10-CM

## 2024-06-03 RX ORDER — ATORVASTATIN CALCIUM 20 MG/1
TABLET, FILM COATED ORAL
Qty: 90 TABLET | Refills: 0 | Status: SHIPPED | OUTPATIENT
Start: 2024-06-03

## 2024-06-10 DIAGNOSIS — I48.91 ATRIAL FIBRILLATION, UNSPECIFIED TYPE: ICD-10-CM

## 2024-06-12 NOTE — TELEPHONE ENCOUNTER
First attempt to reach pt. Left a voicemail for pt to return my call at [phone].       RELAY        Samples ready for P/U  
Patient called office requesting samples of xarelto 20 mg daily, samples prepared for pickup in basement.   
Patient left VM on triage he received VM on sample  - he will be by Thursday to .    
(4) no impairment

## 2024-06-17 ENCOUNTER — OFFICE VISIT (OUTPATIENT)
Dept: CARDIOLOGY | Facility: CLINIC | Age: 69
End: 2024-06-17
Payer: MEDICARE

## 2024-06-17 VITALS
HEART RATE: 93 BPM | DIASTOLIC BLOOD PRESSURE: 88 MMHG | WEIGHT: 315 LBS | BODY MASS INDEX: 42.66 KG/M2 | OXYGEN SATURATION: 97 % | HEIGHT: 72 IN | SYSTOLIC BLOOD PRESSURE: 132 MMHG

## 2024-06-17 DIAGNOSIS — M79.89 LEG SWELLING: Primary | ICD-10-CM

## 2024-06-17 DIAGNOSIS — I10 PRIMARY HYPERTENSION: ICD-10-CM

## 2024-06-17 DIAGNOSIS — I48.19 PERSISTENT ATRIAL FIBRILLATION: ICD-10-CM

## 2024-06-17 DIAGNOSIS — R94.39 ABNORMAL NUCLEAR STRESS TEST: ICD-10-CM

## 2024-06-17 DIAGNOSIS — R06.02 SOB (SHORTNESS OF BREATH): ICD-10-CM

## 2024-06-17 DIAGNOSIS — I48.91 ATRIAL FIBRILLATION, UNSPECIFIED TYPE: ICD-10-CM

## 2024-06-17 PROCEDURE — 1160F RVW MEDS BY RX/DR IN RCRD: CPT | Performed by: NURSE PRACTITIONER

## 2024-06-17 PROCEDURE — 1159F MED LIST DOCD IN RCRD: CPT | Performed by: NURSE PRACTITIONER

## 2024-06-17 PROCEDURE — 99214 OFFICE O/P EST MOD 30 MIN: CPT | Performed by: NURSE PRACTITIONER

## 2024-06-17 PROCEDURE — 93000 ELECTROCARDIOGRAM COMPLETE: CPT | Performed by: NURSE PRACTITIONER

## 2024-06-17 RX ORDER — METOPROLOL SUCCINATE 50 MG/1
50 TABLET, EXTENDED RELEASE ORAL 2 TIMES DAILY
Qty: 180 TABLET | Refills: 3 | Status: SHIPPED | OUTPATIENT
Start: 2024-06-17

## 2024-06-17 RX ORDER — ATORVASTATIN CALCIUM 20 MG/1
20 TABLET, FILM COATED ORAL DAILY
Qty: 90 TABLET | Refills: 3 | Status: SHIPPED | OUTPATIENT
Start: 2024-06-17

## 2024-06-17 RX ORDER — NITROGLYCERIN 0.4 MG/1
TABLET SUBLINGUAL
Qty: 25 TABLET | Refills: 2 | Status: SHIPPED | OUTPATIENT
Start: 2024-06-17

## 2024-06-17 NOTE — PROGRESS NOTES
Subjective     Wade Mack is a 69 y.o. male who presents to day for Shortness of Breath and Edema.    CHIEF COMPLIANT  Chief Complaint   Patient presents with    Shortness of Breath    Edema       Active Problems:  Problem List Items Addressed This Visit    None  Visit Diagnoses       Leg swelling    -  Primary    Relevant Orders    Adult Transthoracic Echo Complete W/ Cont if Necessary Per Protocol    Persistent atrial fibrillation        Relevant Medications    nitroglycerin (NITROSTAT) 0.4 MG SL tablet    metoprolol succinate XL (TOPROL-XL) 50 MG 24 hr tablet    Other Relevant Orders    ECG 12 Lead    Adult Transthoracic Echo Complete W/ Cont if Necessary Per Protocol    Primary hypertension        Relevant Medications    metoprolol succinate XL (TOPROL-XL) 50 MG 24 hr tablet    Other Relevant Orders    ECG 12 Lead    Adult Transthoracic Echo Complete W/ Cont if Necessary Per Protocol    SOB (shortness of breath)        Relevant Medications    nitroglycerin (NITROSTAT) 0.4 MG SL tablet    atorvastatin (LIPITOR) 20 MG tablet    Other Relevant Orders    ECG 12 Lead    Adult Transthoracic Echo Complete W/ Cont if Necessary Per Protocol    Abnormal nuclear stress test        Relevant Medications    nitroglycerin (NITROSTAT) 0.4 MG SL tablet    Atrial fibrillation, unspecified type        Relevant Medications    nitroglycerin (NITROSTAT) 0.4 MG SL tablet    metoprolol succinate XL (TOPROL-XL) 50 MG 24 hr tablet        Problem list:     1.  Atrial fibrillation.  1.1.  Managed currently with rate control and anticoagulation therapy.  2.  Dilated cardiomyopathy, possible tachycardia induced cardiomyopathy.  2.1.  Estimated EF at 40 to 45% per ventriculography during catheterization, 11/2021.  2.2.  Repeat echocardiogram, 6/2022, supporting an EF of 50 to 55%.  3.  Minor nonobstructive CAD per catheterization, 11/2021.  4.  Hypertension  5.  Dyslipidemia    HPI  HPI  Mr. Wade Mack is a 69-year-old male patient who is  being followed up today for atrial fibrillation, shortness of breath, and chronic arterial hypertension.    Patient does have a history of atrial fibrillation and which she is rate controlled with metoprolol anticoagulated with Xarelto.  He denies any bleeding on the Xarelto.  He also denies any palpitations.    Patient also has a history of dilated cardiomyopathy with most recent echocardiogram identified a recovered ejection fraction of 50 to 55%.  He is on olmesartan for ARB therapy, metoprolol succinate for beta-blocker therapy.  In addition to this he is on diuretic therapy of Bumex.  He reports that he only takes the Bumex once every now and then.    Patient does have chronic arterial hypertension when she is on metoprolol and olmesartan.  Today's blood pressure is is 132/88 heart rate of 93.  I do think that some of his fluctuations of his blood pressure at home are mainly associated with his atrial fibrillation rate variation.    Patient does report shortness of breath that mainly occurs with activity.  He says if he walks any distance at all he becomes dyspneic.  He denies any dyspnea just at rest.  However he does have some orthopnea.  He says has not been able to wear his CPAP as of late.  He says it makes him feel like he is smothering.  He says he feels the shortness of breath is gotten worse over the last month.  He has not worn his CPAP in 2 months.  He also reports fatigue where he is tired all the time.    Patient reports that his lower extremity edema is about the same as what it was previously.  He says that if he stands some work for prolonged period of time it will get worse.  He says it usually goes down some overnight.  But he never resolves.  PRIOR MEDS  Current Outpatient Medications on File Prior to Visit   Medication Sig Dispense Refill    allopurinol (ZYLOPRIM) 300 MG tablet       bumetanide (BUMEX) 1 MG tablet bumetanide 1 mg tablet      olmesartan (BENICAR) 20 MG tablet Take 1 tablet by  mouth Daily.      oxyCODONE (ROXICODONE) 10 MG tablet Take 1 tablet by mouth 4 (Four) Times a Day.      rivaroxaban (Xarelto) 20 MG tablet Take 1 tablet by mouth Daily. 28 tablet 0    [DISCONTINUED] atorvastatin (LIPITOR) 20 MG tablet Take 1 tablet by mouth once daily 90 tablet 0    [DISCONTINUED] metoprolol succinate XL (TOPROL-XL) 50 MG 24 hr tablet Take 1 tablet by mouth twice daily 180 tablet 0    [DISCONTINUED] nitroglycerin (NITROSTAT) 0.4 MG SL tablet 1 under the tongue as needed for angina, may repeat q5mins for up three doses 25 tablet 2     No current facility-administered medications on file prior to visit.       ALLERGIES  Patient has no known allergies.    HISTORY  Past Medical History:   Diagnosis Date    Arthritis     Diabetes mellitus     Gout     History of atrial fibrillation     Hypertension     Low back pain        Social History     Socioeconomic History    Marital status:    Tobacco Use    Smoking status: Former     Current packs/day: 0.00     Types: Cigarettes     Quit date: 1995     Years since quittin.5    Smokeless tobacco: Never   Substance and Sexual Activity    Alcohol use: Not Currently     Comment: Former use    Drug use: Never    Sexual activity: Defer       Family History   Problem Relation Age of Onset    Hypertension Father     Arthritis Father        Review of Systems   Constitutional:  Positive for fatigue. Negative for chills and fever.   HENT:  Positive for rhinorrhea. Negative for congestion and sore throat.    Eyes:  Negative for visual disturbance.   Respiratory:  Positive for apnea (CPAP  is having smothering with CPAP lately) and shortness of breath (more with activity).    Cardiovascular:  Positive for leg swelling (from the knee down). Negative for chest pain.   Gastrointestinal:  Negative for constipation, diarrhea and nausea.   Musculoskeletal:  Positive for arthralgias, back pain and neck pain.   Allergic/Immunologic: Negative for environmental  "allergies and food allergies.   Neurological:  Positive for weakness. Negative for dizziness, syncope and light-headedness.   Hematological:  Bruises/bleeds easily.   Psychiatric/Behavioral:  Negative for sleep disturbance.        Objective     VITALS: /88 (BP Location: Left arm, Patient Position: Sitting, Cuff Size: Adult)   Pulse 93   Ht 182.9 cm (72.01\")   Wt (!) 165 kg (364 lb 3.2 oz)   SpO2 97%   BMI 49.38 kg/m²     LABS:   Lab Results (most recent)       None            IMAGING:   No Images in the past 120 days found..    EXAM:  Physical Exam  Vitals and nursing note reviewed.   Constitutional:       Appearance: He is well-developed.   HENT:      Head: Normocephalic.   Neck:      Thyroid: No thyroid mass.      Vascular: No carotid bruit or JVD.      Trachea: Trachea and phonation normal.   Cardiovascular:      Rate and Rhythm: Normal rate. Rhythm irregular.      Pulses:           Radial pulses are 2+ on the right side and 2+ on the left side.        Posterior tibial pulses are 2+ on the right side and 2+ on the left side.      Heart sounds: Normal heart sounds. No murmur heard.     No friction rub. No gallop.   Pulmonary:      Effort: Pulmonary effort is normal. No respiratory distress.      Breath sounds: Normal breath sounds. No wheezing or rales.   Musculoskeletal:         General: No swelling. Normal range of motion.      Cervical back: Neck supple.      Right lower leg: Edema (2+) present.      Left lower leg: Edema (2+) present.   Skin:     General: Skin is warm and dry.      Capillary Refill: Capillary refill takes less than 2 seconds.      Findings: No rash.   Neurological:      Mental Status: He is alert and oriented to person, place, and time.   Psychiatric:         Speech: Speech normal.         Behavior: Behavior normal.         Thought Content: Thought content normal.         Judgment: Judgment normal.         Procedure     ECG 12 Lead    Date/Time: 6/17/2024 8:55 AM  Performed by: " Henrique Ling APRN    Authorized by: Henrique Ling APRN  Comparison: compared with previous ECG from 7/6/2023  Similar to previous ECG  Rhythm: atrial fibrillation  Rate: normal  BPM: 97  QRS axis: left  Other findings: non-specific ST-T wave changes  Comments: QTc 420 ms  No acute change             Assessment & Plan    Diagnosis Plan   1. Leg swelling  Adult Transthoracic Echo Complete W/ Cont if Necessary Per Protocol      2. Persistent atrial fibrillation  ECG 12 Lead    Adult Transthoracic Echo Complete W/ Cont if Necessary Per Protocol      3. Primary hypertension  ECG 12 Lead    Adult Transthoracic Echo Complete W/ Cont if Necessary Per Protocol      4. SOB (shortness of breath)  ECG 12 Lead    Adult Transthoracic Echo Complete W/ Cont if Necessary Per Protocol    nitroglycerin (NITROSTAT) 0.4 MG SL tablet    atorvastatin (LIPITOR) 20 MG tablet      5. Abnormal nuclear stress test  nitroglycerin (NITROSTAT) 0.4 MG SL tablet      6. Atrial fibrillation, unspecified type  metoprolol succinate XL (TOPROL-XL) 50 MG 24 hr tablet      1.  Patient does have continuation of lower extremity edema from the knees down.  He says  only takes the Bumex every now and then.  Due to patient's increasing shortness of breath we did discuss him taking the Bumex every day for a week to see if this helps with the shortness of breath it does seem like it may be fluid retention as a potential cause of his increasing dyspnea.  2.  Patient's blood pressure is controlled on current blood pressure medication regimen.  No medication changes are warranted at this time.  Patient advised to monitor blood pressure on a daily basis and report any persistent highs or lows.  Set goal blood pressure for patient at 130/80 or below.  3.  Patient does have hyperlipidemia in which she says his labs were done by his PCP.  We will refill his atorvastatin as requested.  4.  Patient does have atrial fibrillation and which is persistent.  He is  anticoagulated with Xarelto which samples were provided today.  He denies any significant signs or symptoms of bleeding.  5.  Patient does have a history of cardiomyopathy and which has recovered.  However due to worsening shortness of breath we will repeat an echocardiogram for reevaluation of his ejection fraction as well as diastolic function.  6.  Informed of signs and symptoms of ACS and advised to seek emergent treatment for any new worsening symptoms.  Patient also advised sooner follow-up as needed.  Also advised to follow-up with family doctor as needed  This note is dictated utilizing voice recognition software.  Although this record has been proof read, transcriptional errors may still be present. If questions occur regarding the content of this record please do not hesitate to call our office.  I have reviewed and confirmed the accuracy of the ROS as documented by the MA/SB/RN ORA Collado  Return in about 3 months (around 9/17/2024), or if symptoms worsen or fail to improve.    Diagnoses and all orders for this visit:    1. Leg swelling (Primary)  -     Adult Transthoracic Echo Complete W/ Cont if Necessary Per Protocol; Future    2. Persistent atrial fibrillation  -     ECG 12 Lead  -     Adult Transthoracic Echo Complete W/ Cont if Necessary Per Protocol; Future    3. Primary hypertension  -     ECG 12 Lead  -     Adult Transthoracic Echo Complete W/ Cont if Necessary Per Protocol; Future    4. SOB (shortness of breath)  -     ECG 12 Lead  -     Adult Transthoracic Echo Complete W/ Cont if Necessary Per Protocol; Future  -     nitroglycerin (NITROSTAT) 0.4 MG SL tablet; 1 under the tongue as needed for angina, may repeat q5mins for up three doses  Dispense: 25 tablet; Refill: 2  -     atorvastatin (LIPITOR) 20 MG tablet; Take 1 tablet by mouth Daily.  Dispense: 90 tablet; Refill: 3    5. Abnormal nuclear stress test  -     nitroglycerin (NITROSTAT) 0.4 MG SL tablet; 1 under the tongue as  needed for angina, may repeat q5mins for up three doses  Dispense: 25 tablet; Refill: 2    6. Atrial fibrillation, unspecified type  -     metoprolol succinate XL (TOPROL-XL) 50 MG 24 hr tablet; Take 1 tablet by mouth 2 (Two) Times a Day.  Dispense: 180 tablet; Refill: 3    Other orders  -     rivaroxaban (XARELTO) 20 MG tablet; Take 1 tablet by mouth Daily.  Dispense: 21 tablet; Refill: 0        Wade Mack  reports that he quit smoking about 28 years ago. His smoking use included cigarettes. He has never used smokeless tobacco. I have educated him on the risk of diseases from using tobacco products . Patient does not smoke.       Class 3 Severe Obesity (BMI >=40). Obesity-related health conditions include the following: obstructive sleep apnea. . We discussed portion control and increasing exercise.    Advance Care Planning   ACP discussion was held with the patient during this visit. Patient does not have an advance directive, declines further assistance.             MEDS ORDERED DURING VISIT:  New Medications Ordered This Visit   Medications    rivaroxaban (XARELTO) 20 MG tablet     Sig: Take 1 tablet by mouth Daily.     Dispense:  21 tablet     Refill:  0    nitroglycerin (NITROSTAT) 0.4 MG SL tablet     Si under the tongue as needed for angina, may repeat q5mins for up three doses     Dispense:  25 tablet     Refill:  2    metoprolol succinate XL (TOPROL-XL) 50 MG 24 hr tablet     Sig: Take 1 tablet by mouth 2 (Two) Times a Day.     Dispense:  180 tablet     Refill:  3    atorvastatin (LIPITOR) 20 MG tablet     Sig: Take 1 tablet by mouth Daily.     Dispense:  90 tablet     Refill:  3           This document has been electronically signed by Henrique Ling Jr., APRN  2024 09:30 EDT

## 2024-06-25 DIAGNOSIS — I48.91 ATRIAL FIBRILLATION, UNSPECIFIED TYPE: ICD-10-CM

## 2024-06-25 RX ORDER — METOPROLOL SUCCINATE 50 MG/1
50 TABLET, EXTENDED RELEASE ORAL 2 TIMES DAILY
Qty: 180 TABLET | Refills: 0 | Status: SHIPPED | OUTPATIENT
Start: 2024-06-25

## 2024-07-17 ENCOUNTER — HOSPITAL ENCOUNTER (OUTPATIENT)
Dept: CARDIOLOGY | Facility: HOSPITAL | Age: 69
Discharge: HOME | End: 2024-07-17
Admitting: NURSE PRACTITIONER
Payer: MEDICARE

## 2024-07-17 DIAGNOSIS — M79.89 LEG SWELLING: ICD-10-CM

## 2024-07-17 DIAGNOSIS — I48.19 PERSISTENT ATRIAL FIBRILLATION: ICD-10-CM

## 2024-07-17 DIAGNOSIS — R06.02 SOB (SHORTNESS OF BREATH): ICD-10-CM

## 2024-07-17 DIAGNOSIS — I10 PRIMARY HYPERTENSION: ICD-10-CM

## 2024-07-17 LAB
BH CV ECHO MEAS - ACS: 2.14 CM
BH CV ECHO MEAS - AO MAX PG: 4.4 MMHG
BH CV ECHO MEAS - AO MEAN PG: 2 MMHG
BH CV ECHO MEAS - AO ROOT DIAM: 3.9 CM
BH CV ECHO MEAS - AO V2 MAX: 105 CM/SEC
BH CV ECHO MEAS - AO V2 VTI: 19 CM
BH CV ECHO MEAS - EDV(CUBED): 107.2 ML
BH CV ECHO MEAS - EDV(MOD-SP4): 112 ML
BH CV ECHO MEAS - EF(MOD-SP4): 59 %
BH CV ECHO MEAS - EF_3D-VOL: 54 %
BH CV ECHO MEAS - ESV(CUBED): 27.8 ML
BH CV ECHO MEAS - ESV(MOD-SP4): 45.9 ML
BH CV ECHO MEAS - FS: 36.2 %
BH CV ECHO MEAS - IVS/LVPW: 1.24 CM
BH CV ECHO MEAS - IVSD: 1.78 CM
BH CV ECHO MEAS - LA DIMENSION: 4.4 CM
BH CV ECHO MEAS - LAT PEAK E' VEL: 12.6 CM/SEC
BH CV ECHO MEAS - LV DIASTOLIC VOL/BSA (35-75): 40.8 CM2
BH CV ECHO MEAS - LV MASS(C)D: 331.1 GRAMS
BH CV ECHO MEAS - LV SYSTOLIC VOL/BSA (12-30): 16.7 CM2
BH CV ECHO MEAS - LVIDD: 4.8 CM
BH CV ECHO MEAS - LVIDS: 3 CM
BH CV ECHO MEAS - LVPWD: 1.43 CM
BH CV ECHO MEAS - MED PEAK E' VEL: 9.2 CM/SEC
BH CV ECHO MEAS - MV DEC TIME: 0.3 SEC
BH CV ECHO MEAS - MV E MAX VEL: 87 CM/SEC
BH CV ECHO MEAS - RVDD: 3.6 CM
BH CV ECHO MEAS - SV(MOD-SP4): 66.1 ML
BH CV ECHO MEAS - SVI(MOD-SP4): 24.1 ML/M2
BH CV ECHO MEASUREMENTS AVERAGE E/E' RATIO: 7.98
LEFT ATRIUM VOLUME INDEX: 38.2 ML/M2

## 2024-07-17 PROCEDURE — 93306 TTE W/DOPPLER COMPLETE: CPT

## 2024-07-29 ENCOUNTER — TELEPHONE (OUTPATIENT)
Dept: CARDIOLOGY | Facility: CLINIC | Age: 69
End: 2024-07-29
Payer: MEDICARE

## 2024-07-29 DIAGNOSIS — I48.91 ATRIAL FIBRILLATION, UNSPECIFIED TYPE: ICD-10-CM

## 2024-07-29 NOTE — TELEPHONE ENCOUNTER
Samples set aside and order placed.   Patient will  one day this week. He is aware samples are now upstairs.

## 2024-07-29 NOTE — TELEPHONE ENCOUNTER
----- Message from Nicole MCLAUGHLIN sent at 7/29/2024 11:56 AM EDT -----  Regarding: FW:    ----- Message -----  From: Henrique Ling APRN  Sent: 7/28/2024   1:41 PM EDT  To: Nicole Malave MA  Subject: FW:                                              There is no acute findings on the echocardiogram.  Keep follow-up.  Grade 2 diastolic dysfunction  ----- Message -----  From: Dusty Snyder MD  Sent: 7/27/2024   2:39 PM EDT  To: ORA Collado

## 2024-08-29 DIAGNOSIS — I48.19 PERSISTENT ATRIAL FIBRILLATION: Primary | ICD-10-CM

## 2024-08-29 DIAGNOSIS — R06.02 SOB (SHORTNESS OF BREATH): ICD-10-CM

## 2024-08-29 RX ORDER — ATORVASTATIN CALCIUM 20 MG/1
20 TABLET, FILM COATED ORAL DAILY
Qty: 90 TABLET | Refills: 0 | Status: SHIPPED | OUTPATIENT
Start: 2024-08-29

## 2024-09-25 ENCOUNTER — TELEPHONE (OUTPATIENT)
Dept: CARDIOLOGY | Facility: CLINIC | Age: 69
End: 2024-09-25
Payer: MEDICARE

## 2024-09-25 DIAGNOSIS — I48.91 ATRIAL FIBRILLATION, UNSPECIFIED TYPE: ICD-10-CM

## 2024-09-27 ENCOUNTER — OFFICE VISIT (OUTPATIENT)
Dept: CARDIOLOGY | Facility: CLINIC | Age: 69
End: 2024-09-27
Payer: MEDICARE

## 2024-09-27 VITALS
SYSTOLIC BLOOD PRESSURE: 173 MMHG | OXYGEN SATURATION: 90 % | BODY MASS INDEX: 42.66 KG/M2 | DIASTOLIC BLOOD PRESSURE: 89 MMHG | HEART RATE: 65 BPM | WEIGHT: 315 LBS | HEIGHT: 72 IN

## 2024-09-27 DIAGNOSIS — I48.19 PERSISTENT ATRIAL FIBRILLATION: Primary | ICD-10-CM

## 2024-09-27 DIAGNOSIS — M79.89 LEG SWELLING: ICD-10-CM

## 2024-09-27 DIAGNOSIS — I10 PRIMARY HYPERTENSION: ICD-10-CM

## 2024-09-27 DIAGNOSIS — R06.02 SOB (SHORTNESS OF BREATH): ICD-10-CM

## 2024-09-27 DIAGNOSIS — I48.91 ATRIAL FIBRILLATION, UNSPECIFIED TYPE: ICD-10-CM

## 2024-09-27 PROCEDURE — 1159F MED LIST DOCD IN RCRD: CPT | Performed by: NURSE PRACTITIONER

## 2024-09-27 PROCEDURE — 1160F RVW MEDS BY RX/DR IN RCRD: CPT | Performed by: NURSE PRACTITIONER

## 2024-09-27 PROCEDURE — 93000 ELECTROCARDIOGRAM COMPLETE: CPT | Performed by: NURSE PRACTITIONER

## 2024-09-27 PROCEDURE — 99214 OFFICE O/P EST MOD 30 MIN: CPT | Performed by: NURSE PRACTITIONER

## 2024-09-27 RX ORDER — OLMESARTAN MEDOXOMIL 40 MG/1
40 TABLET ORAL DAILY
Qty: 90 TABLET | Refills: 3 | Status: SHIPPED | OUTPATIENT
Start: 2024-09-27

## 2024-10-09 ENCOUNTER — TELEPHONE (OUTPATIENT)
Dept: CARDIOLOGY | Facility: CLINIC | Age: 69
End: 2024-10-09
Payer: MEDICARE

## 2024-10-09 NOTE — TELEPHONE ENCOUNTER
Pt called the triage line requesting Xarelto 20 mg.  Informed the pt that we do not have any in stock.  Pt inquired about 10 mg and made him aware we don't have any 10 mg at this time.  Inquired if the pt wants us to send in a prescription to his pharmacy and pt does not want to at this time.  Called the pt's pharmacy and they said the cost for a 30 day supply is >$900 and they received a message to call [phone] for assistance with receiving medications. Called and LVM for the pt with this number and instructions.    Post-it note placed on the Xarelto shelf as a FYI when samples received.

## 2024-10-14 ENCOUNTER — TELEPHONE (OUTPATIENT)
Dept: CARDIOLOGY | Facility: CLINIC | Age: 69
End: 2024-10-14
Payer: MEDICARE

## 2024-10-14 DIAGNOSIS — I48.91 ATRIAL FIBRILLATION, UNSPECIFIED TYPE: ICD-10-CM

## 2024-10-28 ENCOUNTER — TELEPHONE (OUTPATIENT)
Dept: CARDIOLOGY | Facility: CLINIC | Age: 69
End: 2024-10-28
Payer: MEDICARE

## 2024-10-28 DIAGNOSIS — I48.91 ATRIAL FIBRILLATION, UNSPECIFIED TYPE: ICD-10-CM

## 2024-11-05 ENCOUNTER — OFFICE VISIT (OUTPATIENT)
Dept: CARDIOLOGY | Facility: CLINIC | Age: 69
End: 2024-11-05
Payer: MEDICARE

## 2024-11-05 VITALS
HEIGHT: 72 IN | SYSTOLIC BLOOD PRESSURE: 143 MMHG | BODY MASS INDEX: 42.66 KG/M2 | HEART RATE: 84 BPM | DIASTOLIC BLOOD PRESSURE: 90 MMHG | WEIGHT: 315 LBS | OXYGEN SATURATION: 95 %

## 2024-11-05 DIAGNOSIS — I48.19 PERSISTENT ATRIAL FIBRILLATION: Primary | ICD-10-CM

## 2024-11-05 DIAGNOSIS — I10 PRIMARY HYPERTENSION: ICD-10-CM

## 2024-11-05 DIAGNOSIS — R06.02 SOB (SHORTNESS OF BREATH): ICD-10-CM

## 2024-11-05 PROCEDURE — 1159F MED LIST DOCD IN RCRD: CPT | Performed by: PHYSICIAN ASSISTANT

## 2024-11-05 PROCEDURE — 99213 OFFICE O/P EST LOW 20 MIN: CPT | Performed by: PHYSICIAN ASSISTANT

## 2024-11-05 PROCEDURE — 1160F RVW MEDS BY RX/DR IN RCRD: CPT | Performed by: PHYSICIAN ASSISTANT

## 2024-11-05 RX ORDER — ALBUTEROL SULFATE 90 UG/1
2 INHALANT RESPIRATORY (INHALATION) EVERY 6 HOURS PRN
COMMUNITY

## 2024-11-05 RX ORDER — POTASSIUM CHLORIDE 1500 MG/1
20 TABLET, EXTENDED RELEASE ORAL DAILY PRN
COMMUNITY

## 2024-11-05 NOTE — PROGRESS NOTES
Problem list     Subjective   Wade Mack is a 69 y.o. male     Chief Complaint   Patient presents with    Follow-up     8 month follow up    Problem list:     1.  Chronic atrial fibrillation.  1.1.  Managed currently with rate control and anticoagulation therapy of Xarelto.  2.  Dilated cardiomyopathy, possible tachycardia induced cardiomyopathy.  2.1.  Estimated EF at 40 to 45% per ventriculography during catheterization, 11/2021.  2.2.  Repeat echocardiogram, 6/2022, supporting an EF of 50 to 55%.  2.3 echocardiogram 7/24: 50-55%, grade 2 diastolic dysfunction moderate biatrial enlargement, trivial MR and AI and physiological TR.  The aortic root is mildly dilated at 3.94 cm.  3.  Minor nonobstructive CAD per catheterization, 11/2021.  4.  Hypertension  5.  Dyslipidemia    HPI  The patient presents to the clinic today for routine evaluation and follow-up.  History is as outlined above.  A-fib continues to be well-controlled with rate suppressive therapy and anticoagulation.  EF historically was low as 40%, but with control of A-fib and cardioprotective/guideline directed medical regimen otherwise, most recent echo supported an EF of 55± percent.  The patient feels well.  His only concern is with sleep apnea.  It seems that his machine at home is not as effective as what he has historically noted.  He is seeing pulmonology to address this.  He denies angina.  He has no symptoms of failure.  There is been no symptoms of A-fib or dysrhythmias otherwise.  He has no further complaints.    Current Outpatient Medications on File Prior to Visit   Medication Sig Dispense Refill    albuterol sulfate HFA (Ventolin HFA) 108 (90 Base) MCG/ACT inhaler Inhale 2 puffs Every 6 (Six) Hours As Needed.      allopurinol (ZYLOPRIM) 300 MG tablet       atorvastatin (LIPITOR) 20 MG tablet Take 1 tablet by mouth once daily 90 tablet 0    bumetanide (BUMEX) 1 MG tablet Take 1 tablet by mouth Daily As Needed.      metoprolol succinate XL  (TOPROL-XL) 50 MG 24 hr tablet Take 1 tablet by mouth twice daily 180 tablet 0    nitroglycerin (NITROSTAT) 0.4 MG SL tablet 1 under the tongue as needed for angina, may repeat q5mins for up three doses 25 tablet 2    olmesartan (BENICAR) 40 MG tablet Take 1 tablet by mouth Daily. 90 tablet 3    oxyCODONE (ROXICODONE) 10 MG tablet Take 1 tablet by mouth 4 (Four) Times a Day.      potassium chloride (KLOR-CON M20) 20 MEQ CR tablet Take 1 tablet by mouth Daily As Needed (takes when takes bumex).      rivaroxaban (Xarelto) 20 MG tablet Take 1 tablet by mouth Daily. 14 tablet 0     No current facility-administered medications on file prior to visit.       Patient has no known allergies.    Past Medical History:   Diagnosis Date    Arthritis     Diabetes mellitus     Gout     History of atrial fibrillation     Hypertension     Low back pain        Social History     Socioeconomic History    Marital status:    Tobacco Use    Smoking status: Former     Current packs/day: 0.00     Types: Cigarettes     Quit date: 1995     Years since quittin.9    Smokeless tobacco: Never   Substance and Sexual Activity    Alcohol use: Not Currently     Comment: Former use    Drug use: Never    Sexual activity: Defer       Family History   Problem Relation Age of Onset    Hypertension Father     Arthritis Father        Review of Systems   Constitutional: Negative.  Negative for chills, diaphoresis, fatigue and fever.   HENT: Negative.     Eyes: Negative.  Negative for visual disturbance.   Respiratory:  Positive for apnea, shortness of breath and wheezing. Negative for cough and chest tightness.    Cardiovascular:  Negative for chest pain, palpitations and leg swelling.   Gastrointestinal: Negative.  Negative for abdominal pain and blood in stool.   Endocrine: Negative.    Genitourinary: Negative.  Negative for hematuria.   Musculoskeletal:  Positive for arthralgias, back pain, myalgias, neck pain and neck stiffness.  "  Skin: Negative.  Negative for rash and wound.   Allergic/Immunologic: Negative.  Negative for environmental allergies and food allergies.   Neurological:  Positive for light-headedness. Negative for dizziness, syncope, weakness, numbness and headaches.   Hematological:  Bruises/bleeds easily (xarelto).   Psychiatric/Behavioral:  Positive for sleep disturbance (sleep apnea).        Objective   Vitals:    24 1346   BP: 143/90   Pulse: 84   SpO2: 95%   Weight: (!) 168 kg (371 lb)   Height: 182.9 cm (72\")      /90   Pulse 84   Ht 182.9 cm (72\")   Wt (!) 168 kg (371 lb)   SpO2 95%   BMI 50.32 kg/m²    Lab Results (most recent)       None          Physical Exam  Vitals and nursing note reviewed.   Constitutional:       General: He is not in acute distress.     Appearance: He is well-developed. He is obese.   HENT:      Head: Normocephalic and atraumatic.   Eyes:      Conjunctiva/sclera: Conjunctivae normal.      Pupils: Pupils are equal, round, and reactive to light.   Neck:      Vascular: No JVD.      Trachea: No tracheal deviation.   Cardiovascular:      Rate and Rhythm: Normal rate. Rhythm irregular.      Heart sounds: Normal heart sounds.   Pulmonary:      Effort: Pulmonary effort is normal.      Breath sounds: Normal breath sounds.   Abdominal:      General: Bowel sounds are normal. There is no distension.      Palpations: Abdomen is soft. There is no mass.      Tenderness: There is no abdominal tenderness. There is no guarding or rebound.   Musculoskeletal:         General: No tenderness or deformity. Normal range of motion.      Cervical back: Normal range of motion and neck supple.      Right lower le+ Edema present.      Left lower le+ Edema present.   Skin:     General: Skin is warm and dry.      Coloration: Skin is not pale.      Findings: No erythema or rash.   Neurological:      Mental Status: He is alert and oriented to person, place, and time.   Psychiatric:         Behavior: " Behavior normal.         Thought Content: Thought content normal.         Judgment: Judgment normal.           Procedure   Procedures       Assessment & Plan      Diagnosis Plan   1. Persistent atrial fibrillation        2. Primary hypertension        3. SOB (shortness of breath)          1.  At this time, the patient appears to be doing well.  A-fib is well-maintained on current medical regimen.  We will continue the same without change.    2.  At last appointment, olmesartan was increased from 20 to 40 mg.  He is hypertensive today but almost always normotensive when checked at home.  He will monitor that and call for any issues.  As he is doing well in that regard, nothing further.    3.  The patient appears to be doing well.  We will continue medications without change.  No further evaluation is indicated.  Again most recent echo indicates now normalized systolic function and cardiac parameters otherwise which are stable.  We will follow this long-term.    4.  We will continue to see this pleasant gentleman on routine 6 to 9-month intervals, sooner for complications.           Advance Care Planning   ACP discussion was held with the patient during this visit. Patient does not have an advance directive, declines further assistance.                       Electronically signed by:

## 2024-11-13 DIAGNOSIS — I48.91 ATRIAL FIBRILLATION, UNSPECIFIED TYPE: ICD-10-CM

## 2024-11-13 NOTE — TELEPHONE ENCOUNTER
Relay...  Samples of Xarelto 15 mg and 2.5 mg set aside for patient. He is to take one 15 mg tablet with two 2.5 mg tablets daily to equal 20 mg. Left message of this for patient and will explain again when picking up samples.

## 2024-11-27 DIAGNOSIS — R06.02 SOB (SHORTNESS OF BREATH): ICD-10-CM

## 2024-11-27 RX ORDER — ATORVASTATIN CALCIUM 20 MG/1
20 TABLET, FILM COATED ORAL DAILY
Qty: 90 TABLET | Refills: 0 | Status: SHIPPED | OUTPATIENT
Start: 2024-11-27

## 2024-12-04 DIAGNOSIS — I48.91 ATRIAL FIBRILLATION, UNSPECIFIED TYPE: ICD-10-CM

## 2024-12-18 ENCOUNTER — TELEPHONE (OUTPATIENT)
Dept: CARDIOLOGY | Facility: CLINIC | Age: 69
End: 2024-12-18
Payer: MEDICARE

## 2025-01-20 ENCOUNTER — TELEPHONE (OUTPATIENT)
Dept: CARDIOLOGY | Facility: CLINIC | Age: 70
End: 2025-01-20
Payer: MEDICARE

## 2025-01-20 NOTE — TELEPHONE ENCOUNTER
Called  patient to inform him that we are currently out of samples of xarelto 20 mg no answer, left voice message requesting call back.

## 2025-01-21 NOTE — TELEPHONE ENCOUNTER
Called patient informed him that we are currently out of samples and he reported that he had gotten some from his PCP and I had instructed him to check back with us next week. Patient verbalizes understanding of instruction's.

## 2025-02-26 DIAGNOSIS — R06.02 SOB (SHORTNESS OF BREATH): ICD-10-CM

## 2025-02-27 RX ORDER — ATORVASTATIN CALCIUM 20 MG/1
20 TABLET, FILM COATED ORAL DAILY
Qty: 90 TABLET | Refills: 0 | Status: SHIPPED | OUTPATIENT
Start: 2025-02-27

## 2025-03-04 DIAGNOSIS — R06.02 SOB (SHORTNESS OF BREATH): ICD-10-CM

## 2025-03-04 NOTE — TELEPHONE ENCOUNTER
Name from pharmacy: Atorvastatin Calcium 20 MG Oral Tablet         Will file in chart as: atorvastatin (LIPITOR) 20 MG tablet     Possible duplicate: Katiana to review recent actions on this medication    Sig: Take 1 tablet by mouth once daily    Disp: 90 tablet    Refills: 0    Start: 3/4/2025    Class: Normal    Non-formulary For: SOB (shortness of breath)    Last ordered: 5 days ago (2/27/2025) by ALLI Chanel    Last refill: 11/27/2024    Rx #: [id number]    [facility]-CoA Reductase Inhibitors (Statins) Protocol [id number]/04/2025 08:52 AM   Protocol Details Lipid panel in past 12 months    ALK Phos in past 12 months    ALT in past 12 months    AST in past 12 months    Recent or future visit with authorizing provider

## 2025-03-07 RX ORDER — ATORVASTATIN CALCIUM 20 MG/1
20 TABLET, FILM COATED ORAL DAILY
Qty: 90 TABLET | Refills: 1 | Status: SHIPPED | OUTPATIENT
Start: 2025-03-07

## 2025-04-16 ENCOUNTER — TELEPHONE (OUTPATIENT)
Dept: CARDIOLOGY | Facility: CLINIC | Age: 70
End: 2025-04-16
Payer: MEDICARE

## 2025-04-16 DIAGNOSIS — I48.91 ATRIAL FIBRILLATION, UNSPECIFIED TYPE: ICD-10-CM

## 2025-04-16 NOTE — TELEPHONE ENCOUNTER
Xarelto samples ordered and set aside for patient. Left Vm for patient.    RELAY:    Xarelto samples ready for

## 2025-05-08 ENCOUNTER — TELEPHONE (OUTPATIENT)
Dept: CARDIOLOGY | Facility: CLINIC | Age: 70
End: 2025-05-08
Payer: MEDICARE

## 2025-05-08 DIAGNOSIS — I48.91 ATRIAL FIBRILLATION, UNSPECIFIED TYPE: ICD-10-CM

## 2025-06-03 ENCOUNTER — TELEPHONE (OUTPATIENT)
Dept: CARDIOLOGY | Facility: CLINIC | Age: 70
End: 2025-06-03
Payer: MEDICARE

## 2025-06-03 DIAGNOSIS — I48.91 ATRIAL FIBRILLATION, UNSPECIFIED TYPE: ICD-10-CM

## 2025-06-30 ENCOUNTER — TELEPHONE (OUTPATIENT)
Dept: CARDIOLOGY | Facility: CLINIC | Age: 70
End: 2025-06-30

## 2025-06-30 NOTE — TELEPHONE ENCOUNTER
"    Caller: Wade Mack W \"Ye\"    Relationship to patient: Self    Best call back number: [phone]     Chief complaint: PT HAS TO TAKE MOM TO APPT IN [place], SO HE HAD TO RESCHEDULE APPT WITH ALLI RICHMOND. DOES NOT WISH TO SEE ANYONE ELSE AND WAS WONDERING IF COULD BE WORKED IN SOMETIME SOON. HE WAS SOB ON THE PHONE.     Type of visit: 8 MONTH F/U    Requested date: ASAP     If rescheduling, when is the original appointment: [facility] APPT WAS ON 7.7.25, MOVED TO 1.19.25      Additional notes:PLEASE ADVISE SCHEDULING TIME FRAME.      "

## 2025-07-02 ENCOUNTER — OFFICE VISIT (OUTPATIENT)
Dept: CARDIOLOGY | Facility: CLINIC | Age: 70
End: 2025-07-02
Payer: MEDICARE

## 2025-07-02 VITALS
WEIGHT: 315 LBS | HEIGHT: 72 IN | HEART RATE: 86 BPM | DIASTOLIC BLOOD PRESSURE: 73 MMHG | BODY MASS INDEX: 42.66 KG/M2 | OXYGEN SATURATION: 95 % | SYSTOLIC BLOOD PRESSURE: 122 MMHG

## 2025-07-02 DIAGNOSIS — R06.02 SOB (SHORTNESS OF BREATH): ICD-10-CM

## 2025-07-02 DIAGNOSIS — I48.19 PERSISTENT ATRIAL FIBRILLATION: Primary | ICD-10-CM

## 2025-07-02 DIAGNOSIS — I10 PRIMARY HYPERTENSION: ICD-10-CM

## 2025-07-02 DIAGNOSIS — I48.91 ATRIAL FIBRILLATION, UNSPECIFIED TYPE: ICD-10-CM

## 2025-07-02 PROCEDURE — 99213 OFFICE O/P EST LOW 20 MIN: CPT | Performed by: PHYSICIAN ASSISTANT

## 2025-07-02 PROCEDURE — 1160F RVW MEDS BY RX/DR IN RCRD: CPT | Performed by: PHYSICIAN ASSISTANT

## 2025-07-02 PROCEDURE — 1159F MED LIST DOCD IN RCRD: CPT | Performed by: PHYSICIAN ASSISTANT

## 2025-07-02 RX ORDER — METOPROLOL SUCCINATE 50 MG/1
50 TABLET, EXTENDED RELEASE ORAL 2 TIMES DAILY
Qty: 180 TABLET | Refills: 3 | Status: SHIPPED | OUTPATIENT
Start: 2025-07-02 | End: 2025-07-02 | Stop reason: SDUPTHER

## 2025-07-02 RX ORDER — METOPROLOL SUCCINATE 50 MG/1
50 TABLET, EXTENDED RELEASE ORAL DAILY
COMMUNITY
End: 2025-07-02 | Stop reason: SDUPTHER

## 2025-07-02 RX ORDER — METOPROLOL SUCCINATE 50 MG/1
50 TABLET, EXTENDED RELEASE ORAL DAILY
Qty: 90 TABLET | Refills: 3 | Status: SHIPPED | OUTPATIENT
Start: 2025-07-02

## 2025-07-02 RX ORDER — AMLODIPINE BESYLATE 5 MG/1
5 TABLET ORAL DAILY
COMMUNITY
Start: 2025-06-16

## 2025-07-02 NOTE — PROGRESS NOTES
Problem list     Subjective   Wade Mack is a 70 y.o. male     Chief Complaint   Patient presents with    Follow-up     8 month follow up    Problem list:     1.  Chronic atrial fibrillation.  1.1.  Managed currently with rate control and anticoagulation therapy of Xarelto.  2.  Dilated cardiomyopathy, possible tachycardia induced cardiomyopathy.  2.1.  Estimated EF at 40 to 45% per ventriculography during catheterization, 11/2021.  2.2.  Repeat echocardiogram, 6/2022, supporting an EF of 50 to 55%.  2.3 echocardiogram 7/24: 50-55%, grade 2 diastolic dysfunction moderate biatrial enlargement, trivial MR and AI and physiological TR.  The aortic root is mildly dilated at 3.94 cm.  3.  Minor nonobstructive CAD per catheterization, 11/2021.  4.  Hypertension  5.  Dyslipidemia    HPI  The patient presents in the clinic today for routine evaluation and follow-up.  Cardiovascular history is as outlined above.  He initially demonstrated evidence of dilated cardiomyopathy, questionably related to A-fib.  With treatment of A-fib, overall clinical scenario has improved.  Last echo indicates now normalized systolic function.  He confirms with me that A-fib is well-maintained with beta-blocker therapy for rate suppressive agent.  Anticoagulation is tolerated without complication.  He feels well on current medical regimen otherwise.  Antihypertensive regimen was recently adjusted by his primary care provider and he is now normotensive.  He denies angina.  Dyspnea is at baseline.  He has minimal symptoms of A-fib.  He has no further complaints.    Current Outpatient Medications on File Prior to Visit   Medication Sig Dispense Refill    albuterol sulfate HFA (Ventolin HFA) 108 (90 Base) MCG/ACT inhaler Inhale 2 puffs Every 6 (Six) Hours As Needed.      allopurinol (ZYLOPRIM) 300 MG tablet       amLODIPine (NORVASC) 5 MG tablet Take 1 tablet by mouth Daily.      atorvastatin (LIPITOR) 20 MG tablet Take 1 tablet by mouth once  daily 90 tablet 1    bumetanide (BUMEX) 1 MG tablet Take 1 tablet by mouth Daily As Needed.      nitroglycerin (NITROSTAT) 0.4 MG SL tablet 1 under the tongue as needed for angina, may repeat q5mins for up three doses 25 tablet 2    olmesartan (BENICAR) 40 MG tablet Take 1 tablet by mouth Daily. 90 tablet 3    oxyCODONE (ROXICODONE) 10 MG tablet Take 1 tablet by mouth 4 (Four) Times a Day.      potassium chloride (KLOR-CON M20) 20 MEQ CR tablet Take 1 tablet by mouth Daily As Needed (takes when takes bumex).      rivaroxaban (Xarelto) 20 MG tablet Take 1 tablet by mouth Daily. 14 tablet 0    [DISCONTINUED] metoprolol succinate XL (TOPROL-XL) 50 MG 24 hr tablet Take 1 tablet by mouth Daily.      [DISCONTINUED] metoprolol succinate XL (TOPROL-XL) 50 MG 24 hr tablet Take 1 tablet by mouth twice daily 180 tablet 0     No current facility-administered medications on file prior to visit.       Patient has no known allergies.    Past Medical History:   Diagnosis Date    Arthritis     Diabetes mellitus     Gout     History of atrial fibrillation     Hypertension     Low back pain        Social History     Socioeconomic History    Marital status:    Tobacco Use    Smoking status: Former     Current packs/day: 0.00     Types: Cigarettes     Quit date: 1995     Years since quittin.6    Smokeless tobacco: Never   Vaping Use    Vaping status: Never Used   Substance and Sexual Activity    Alcohol use: Not Currently     Comment: Former use    Drug use: Never    Sexual activity: Defer       Family History   Problem Relation Age of Onset    Hypertension Father     Arthritis Father        Review of Systems   Constitutional:  Positive for fatigue. Negative for chills, diaphoresis and fever.   HENT: Negative.  Negative for hearing loss.    Eyes: Negative.  Negative for visual disturbance.   Respiratory:  Positive for apnea, shortness of breath and wheezing. Negative for cough and chest tightness.    Cardiovascular:  "Negative.  Negative for chest pain, palpitations and leg swelling.   Gastrointestinal: Negative.  Negative for abdominal pain and blood in stool.   Endocrine: Negative.    Genitourinary: Negative.  Negative for hematuria.   Musculoskeletal:  Positive for arthralgias, back pain, myalgias, neck pain and neck stiffness.   Skin: Negative.  Negative for rash and wound.   Allergic/Immunologic: Negative.  Negative for environmental allergies and food allergies.   Neurological:  Positive for weakness (in lower extremities if stands too long). Negative for dizziness, syncope, light-headedness, numbness and headaches.   Hematological:  Bruises/bleeds easily (on xarelto).   Psychiatric/Behavioral:  Positive for sleep disturbance (sleep apnea).        Objective   Vitals:    07/02/25 1135   BP: 122/73   Pulse: 86   SpO2: 95%   Weight: (!) 169 kg (373 lb)   Height: 182.9 cm (72\")      /73   Pulse 86   Ht 182.9 cm (72\")   Wt (!) 169 kg (373 lb)   SpO2 95%   BMI 50.59 kg/m²    Lab Results (most recent)       None          Physical Exam  Vitals and nursing note reviewed.   Constitutional:       General: He is not in acute distress.     Appearance: He is well-developed.   HENT:      Head: Normocephalic and atraumatic.   Eyes:      Conjunctiva/sclera: Conjunctivae normal.      Pupils: Pupils are equal, round, and reactive to light.   Neck:      Vascular: No JVD.      Trachea: No tracheal deviation.   Cardiovascular:      Rate and Rhythm: Normal rate. Rhythm irregular.      Heart sounds: Normal heart sounds.   Pulmonary:      Effort: Pulmonary effort is normal.      Breath sounds: Normal breath sounds.   Abdominal:      General: Bowel sounds are normal. There is no distension.      Palpations: Abdomen is soft. There is no mass.      Tenderness: There is no abdominal tenderness. There is no guarding or rebound.   Musculoskeletal:         General: No tenderness or deformity. Normal range of motion.      Cervical back: Normal " range of motion and neck supple.      Right lower le+ Edema present.      Left lower le+ Edema present.   Skin:     General: Skin is warm and dry.      Coloration: Skin is not pale.      Findings: No erythema or rash.   Neurological:      Mental Status: He is alert and oriented to person, place, and time.   Psychiatric:         Behavior: Behavior normal.         Thought Content: Thought content normal.         Judgment: Judgment normal.           Procedure   Procedures       Assessment & Plan      Diagnosis Plan   1. Persistent atrial fibrillation        2. SOB (shortness of breath)        3. Primary hypertension          1.  The patient presents in the clinic today for routine follow-up.  A-fib is well-maintained and treated with rate suppressive therapy and anticoagulation.  I would make no adjustments in medical regimen.    2.  Symptomatically, the patient reports minimal dyspnea now.  He reports no failure nor significant dysrhythmic activity.  Most recent workup is indicated now normalized systolic function, and previous cath supported minor disease.  We will continue to treat medically and follow clinically.    3.  With recent adjustment of antihypertensive regimen by his report, he is now normotensive.  He will monitor this and call for any issues.  Nothing further and we will continue to see him on 6-month intervals.           Wade CORTEZ Frederick  reports that he quit smoking about 29 years ago. His smoking use included cigarettes. He has never used smokeless tobacco.Advance Care Planning   ACP discussion was held with the patient during this visit. Patient does not have an advance directive, declines further assistance.           Electronically signed by:

## 2025-07-25 ENCOUNTER — TELEPHONE (OUTPATIENT)
Dept: CARDIOLOGY | Facility: CLINIC | Age: 70
End: 2025-07-25
Payer: MEDICARE

## 2025-07-25 DIAGNOSIS — I48.91 ATRIAL FIBRILLATION, UNSPECIFIED TYPE: ICD-10-CM

## 2025-08-14 DIAGNOSIS — I48.91 ATRIAL FIBRILLATION, UNSPECIFIED TYPE: ICD-10-CM
